# Patient Record
Sex: FEMALE | Race: WHITE | NOT HISPANIC OR LATINO | Employment: UNEMPLOYED | URBAN - NONMETROPOLITAN AREA
[De-identification: names, ages, dates, MRNs, and addresses within clinical notes are randomized per-mention and may not be internally consistent; named-entity substitution may affect disease eponyms.]

---

## 2017-09-07 ENCOUNTER — OFFICE VISIT (OUTPATIENT)
Dept: URGENT CARE | Facility: PHYSICIAN GROUP | Age: 67
End: 2017-09-07
Payer: MEDICARE

## 2017-09-07 VITALS
HEART RATE: 67 BPM | RESPIRATION RATE: 16 BRPM | TEMPERATURE: 97.9 F | BODY MASS INDEX: 41.19 KG/M2 | SYSTOLIC BLOOD PRESSURE: 120 MMHG | HEIGHT: 65 IN | OXYGEN SATURATION: 98 % | DIASTOLIC BLOOD PRESSURE: 82 MMHG | WEIGHT: 247.2 LBS

## 2017-09-07 DIAGNOSIS — G62.9 PERIPHERAL POLYNEUROPATHY: ICD-10-CM

## 2017-09-07 DIAGNOSIS — E66.01 MORBID OBESITY WITH BMI OF 40.0-44.9, ADULT (HCC): ICD-10-CM

## 2017-09-07 DIAGNOSIS — Z76.0 MEDICATION REFILL: Primary | ICD-10-CM

## 2017-09-07 PROCEDURE — 99214 OFFICE O/P EST MOD 30 MIN: CPT | Performed by: PHYSICIAN ASSISTANT

## 2017-09-07 RX ORDER — GABAPENTIN 400 MG/1
800 CAPSULE ORAL 4 TIMES DAILY
COMMUNITY
End: 2017-09-07 | Stop reason: SDUPTHER

## 2017-09-07 RX ORDER — GABAPENTIN 400 MG/1
800 CAPSULE ORAL 4 TIMES DAILY
Qty: 240 CAP | Refills: 0 | Status: SHIPPED | OUTPATIENT
Start: 2017-09-07 | End: 2017-10-07

## 2017-09-07 ASSESSMENT — PAIN SCALES - GENERAL: PAINLEVEL: 10=SEVERE PAIN

## 2017-09-07 NOTE — PATIENT INSTRUCTIONS
"Smoking Cessation, Tips for Success  If you are ready to quit smoking, congratulations! You have chosen to help yourself be healthier. Cigarettes bring nicotine, tar, carbon monoxide, and other irritants into your body. Your lungs, heart, and blood vessels will be able to work better without these poisons. There are many different ways to quit smoking. Nicotine gum, nicotine patches, a nicotine inhaler, or nicotine nasal spray can help with physical craving. Hypnosis, support groups, and medicines help break the habit of smoking.  WHAT THINGS CAN I DO TO MAKE QUITTING EASIER?   Here are some tips to help you quit for good:  · Pick a date when you will quit smoking completely. Tell all of your friends and family about your plan to quit on that date.  · Do not try to slowly cut down on the number of cigarettes you are smoking. Pick a quit date and quit smoking completely starting on that day.  · Throw away all cigarettes.    · Clean and remove all ashtrays from your home, work, and car.  · On a card, write down your reasons for quitting. Carry the card with you and read it when you get the urge to smoke.  · Cleanse your body of nicotine. Drink enough water and fluids to keep your urine clear or pale yellow. Do this after quitting to flush the nicotine from your body.  · Learn to predict your moods. Do not let a bad situation be your excuse to have a cigarette. Some situations in your life might tempt you into wanting a cigarette.  · Never have \"just one\" cigarette. It leads to wanting another and another. Remind yourself of your decision to quit.  · Change habits associated with smoking. If you smoked while driving or when feeling stressed, try other activities to replace smoking. Stand up when drinking your coffee. Brush your teeth after eating. Sit in a different chair when you read the paper. Avoid alcohol while trying to quit, and try to drink fewer caffeinated beverages. Alcohol and caffeine may urge you to " "smoke.  · Avoid foods and drinks that can trigger a desire to smoke, such as sugary or spicy foods and alcohol.  · Ask people who smoke not to smoke around you.  · Have something planned to do right after eating or having a cup of coffee. For example, plan to take a walk or exercise.  · Try a relaxation exercise to calm you down and decrease your stress. Remember, you may be tense and nervous for the first 2 weeks after you quit, but this will pass.  · Find new activities to keep your hands busy. Play with a pen, coin, or rubber band. Doodle or draw things on paper.  · Brush your teeth right after eating. This will help cut down on the craving for the taste of tobacco after meals. You can also try mouthwash.    · Use oral substitutes in place of cigarettes. Try using lemon drops, carrots, cinnamon sticks, or chewing gum. Keep them handy so they are available when you have the urge to smoke.  · When you have the urge to smoke, try deep breathing.  · Designate your home as a nonsmoking area.  · If you are a heavy smoker, ask your health care provider about a prescription for nicotine chewing gum. It can ease your withdrawal from nicotine.  · Reward yourself. Set aside the cigarette money you save and buy yourself something nice.  · Look for support from others. Join a support group or smoking cessation program. Ask someone at home or at work to help you with your plan to quit smoking.  · Always ask yourself, \"Do I need this cigarette or is this just a reflex?\" Tell yourself, \"Today, I choose not to smoke,\" or \"I do not want to smoke.\" You are reminding yourself of your decision to quit.  · Do not replace cigarette smoking with electronic cigarettes (commonly called e-cigarettes). The safety of e-cigarettes is unknown, and some may contain harmful chemicals.  · If you relapse, do not give up! Plan ahead and think about what you will do the next time you get the urge to smoke.  HOW WILL I FEEL WHEN I QUIT SMOKING?  You " may have symptoms of withdrawal because your body is used to nicotine (the addictive substance in cigarettes). You may crave cigarettes, be irritable, feel very hungry, cough often, get headaches, or have difficulty concentrating. The withdrawal symptoms are only temporary. They are strongest when you first quit but will go away within 10-14 days. When withdrawal symptoms occur, stay in control. Think about your reasons for quitting. Remind yourself that these are signs that your body is healing and getting used to being without cigarettes. Remember that withdrawal symptoms are easier to treat than the major diseases that smoking can cause.   Even after the withdrawal is over, expect periodic urges to smoke. However, these cravings are generally short lived and will go away whether you smoke or not. Do not smoke!  WHAT RESOURCES ARE AVAILABLE TO HELP ME QUIT SMOKING?  Your health care provider can direct you to community resources or hospitals for support, which may include:  · Group support.  · Education.  · Hypnosis.  · Therapy.     This information is not intended to replace advice given to you by your health care provider. Make sure you discuss any questions you have with your health care provider.     Document Released: 09/15/2005 Document Revised: 01/08/2016 Document Reviewed: 06/05/2014  [employer] Interactive Patient Education ©2016 [employer]

## 2017-09-07 NOTE — PROGRESS NOTES
Chief Complaint   Patient presents with   • Leg Pain     ran out of medication       HISTORY OF PRESENT ILLNESS: Patient is a 66 y.o. female who presents today becauseShe has bilateral leg pain. Has a history of peripheral neuropathy, ran out of her gabapentin a few weeks ago. She didn't think it was doing much, although she had only been taking 2 pills twice a day instead of 4 times a day as prescribed. However, when she came off of the medication, she noticed a significant difference in the pain in her legs, primarily at night.    Patient Active Problem List    Diagnosis Date Noted   • Morbid obesity with BMI of 40.0-44.9, adult ([facility]) 09/07/2017       Allergies:Review of patient's allergies indicates no known allergies.    Current Outpatient Prescriptions Ordered in [facility]   Medication Sig Dispense Refill   • GABAPENTIN PO Take  by mouth.     • gabapentin (NEURONTIN) 400 MG Cap Take 2 Caps by mouth 4 times a day for 30 days. 240 Cap 0     No current Epic-ordered facility-administered medications on file.        No past medical history on file.    Social History   Substance Use Topics   • Smoking status: Current Every Day Smoker     Packs/day: 1.00     Years: 50.00     Types: Cigarettes   • Smokeless tobacco: Never Used   • Alcohol use Yes      Comment: rare       No family status information on file.   No family history on file.    ROS:  Review of Systems   Constitutional: Negative for fever, chills, weight loss and malaise/fatigue.   HENT: Negative for ear pain, nosebleeds, congestion, sore throat and neck pain.    Eyes: Negative for blurred vision.   Respiratory: Negative for cough, sputum production, shortness of breath and wheezing.    Cardiovascular: Negative for chest pain, palpitations, orthopnea and leg swelling.   Gastrointestinal: Negative for heartburn, nausea, vomiting and abdominal pain.   Genitourinary: Negative for dysuria, urgency and frequency.     Exam:  Blood pressure 120/82, pulse 67, temperature  "36.6 °C (97.9 °F), resp. rate 16, height 1.651 m (5' 5\"), weight 112.1 kg (247 lb 3.2 oz), SpO2 98 %.  General:  Well nourished, well developed female in NAD  Head:Normocephalic, atraumatic  Eyes: PERRLA, EOM within normal limits, no conjunctival injection, no scleral icterus, visual fields and acuity grossly intact.  Pulmonary: chest is symmetrical with respiration, no wheezes, crackles, or rhonchi.  Cardiovascular: regular rate and rhythm without murmurs, rubs, or gallops.  Extremities: no clubbing, cyanosis, or edema.    Please note that this dictation was created using voice recognition software. I have made every reasonable attempt to correct obvious errors, but I expect that there are errors of grammar and possibly content that I did not discover before finalizing the note.    Assessment/Plan:  1. Medication refill     2. Morbid obesity with BMI of 40.0-44.9, adult (CMS-HCC)  Patient identified as having weight management issue.  Appropriate orders and counseling given.   3. Peripheral polyneuropathy (CMS-HCC)  gabapentin (NEURONTIN) 400 MG Cap       Followup with primary care in the next 7-10 days if not significantly improving, return to the urgent care or go to the emergency room sooner for any worsening of symptoms.       "

## 2018-04-18 ENCOUNTER — APPOINTMENT (OUTPATIENT)
Dept: RADIOLOGY | Facility: MEDICAL CENTER | Age: 68
DRG: 281 | End: 2018-04-18
Attending: STUDENT IN AN ORGANIZED HEALTH CARE EDUCATION/TRAINING PROGRAM
Payer: MEDICARE

## 2018-04-18 ENCOUNTER — HOSPITAL ENCOUNTER (INPATIENT)
Facility: MEDICAL CENTER | Age: 68
LOS: 2 days | DRG: 281 | End: 2018-04-20
Attending: EMERGENCY MEDICINE | Admitting: INTERNAL MEDICINE
Payer: MEDICARE

## 2018-04-18 ENCOUNTER — APPOINTMENT (OUTPATIENT)
Dept: RADIOLOGY | Facility: MEDICAL CENTER | Age: 68
DRG: 281 | End: 2018-04-18
Attending: EMERGENCY MEDICINE
Payer: MEDICARE

## 2018-04-18 ENCOUNTER — OFFICE VISIT (OUTPATIENT)
Dept: URGENT CARE | Facility: PHYSICIAN GROUP | Age: 68
End: 2018-04-18
Payer: MEDICARE

## 2018-04-18 VITALS
OXYGEN SATURATION: 96 % | HEIGHT: 65 IN | RESPIRATION RATE: 20 BRPM | SYSTOLIC BLOOD PRESSURE: 110 MMHG | HEART RATE: 84 BPM | DIASTOLIC BLOOD PRESSURE: 78 MMHG | TEMPERATURE: 98.8 F | WEIGHT: 256 LBS | BODY MASS INDEX: 42.65 KG/M2

## 2018-04-18 DIAGNOSIS — M79.2 NEUROPATHIC PAIN: ICD-10-CM

## 2018-04-18 DIAGNOSIS — R07.9 ACUTE CHEST PAIN: ICD-10-CM

## 2018-04-18 DIAGNOSIS — R07.2 PRECORDIAL PAIN: ICD-10-CM

## 2018-04-18 PROBLEM — G62.9 PERIPHERAL NEUROPATHY: Chronic | Status: ACTIVE | Noted: 2018-04-18

## 2018-04-18 PROBLEM — Z98.61 CAD S/P PERCUTANEOUS CORONARY ANGIOPLASTY: Chronic | Status: ACTIVE | Noted: 2018-04-18

## 2018-04-18 PROBLEM — I25.110 CORONARY ARTERY DISEASE WITH UNSTABLE ANGINA PECTORIS (HCC): Status: ACTIVE | Noted: 2018-04-18

## 2018-04-18 PROBLEM — I25.10 CAD S/P PERCUTANEOUS CORONARY ANGIOPLASTY: Chronic | Status: ACTIVE | Noted: 2018-04-18

## 2018-04-18 PROBLEM — I24.9 ACUTE CORONARY SYNDROME (HCC): Status: ACTIVE | Noted: 2018-04-18

## 2018-04-18 PROBLEM — F17.200 TOBACCO DEPENDENCY: Status: ACTIVE | Noted: 2018-04-18

## 2018-04-18 LAB
ALBUMIN SERPL BCP-MCNC: 3.8 G/DL (ref 3.2–4.9)
ALBUMIN/GLOB SERPL: 1.2 G/DL
ALP SERPL-CCNC: 67 U/L (ref 30–99)
ALT SERPL-CCNC: 14 U/L (ref 2–50)
ANION GAP SERPL CALC-SCNC: 9 MMOL/L (ref 0–11.9)
ANION GAP SERPL CALC-SCNC: 9 MMOL/L (ref 0–11.9)
APPEARANCE UR: CLEAR
AST SERPL-CCNC: 22 U/L (ref 12–45)
BASOPHILS # BLD AUTO: 0.7 % (ref 0–1.8)
BASOPHILS # BLD: 0.05 K/UL (ref 0–0.12)
BILIRUB SERPL-MCNC: 0.5 MG/DL (ref 0.1–1.5)
BILIRUB UR QL STRIP.AUTO: NEGATIVE
BNP SERPL-MCNC: 24 PG/ML (ref 0–100)
BUN SERPL-MCNC: 10 MG/DL (ref 8–22)
BUN SERPL-MCNC: 9 MG/DL (ref 8–22)
CALCIUM SERPL-MCNC: 9.5 MG/DL (ref 8.5–10.5)
CALCIUM SERPL-MCNC: 9.6 MG/DL (ref 8.5–10.5)
CHLORIDE SERPL-SCNC: 103 MMOL/L (ref 96–112)
CHLORIDE SERPL-SCNC: 107 MMOL/L (ref 96–112)
CO2 SERPL-SCNC: 23 MMOL/L (ref 20–33)
CO2 SERPL-SCNC: 26 MMOL/L (ref 20–33)
COLOR UR: YELLOW
CREAT SERPL-MCNC: 0.63 MG/DL (ref 0.5–1.4)
CREAT SERPL-MCNC: 0.66 MG/DL (ref 0.5–1.4)
EKG IMPRESSION: NORMAL
EOSINOPHIL # BLD AUTO: 0.13 K/UL (ref 0–0.51)
EOSINOPHIL NFR BLD: 1.7 % (ref 0–6.9)
ERYTHROCYTE [DISTWIDTH] IN BLOOD BY AUTOMATED COUNT: 43.8 FL (ref 35.9–50)
ERYTHROCYTE [DISTWIDTH] IN BLOOD BY AUTOMATED COUNT: 44.2 FL (ref 35.9–50)
GLOBULIN SER CALC-MCNC: 3.3 G/DL (ref 1.9–3.5)
GLUCOSE SERPL-MCNC: 117 MG/DL (ref 65–99)
GLUCOSE SERPL-MCNC: 118 MG/DL (ref 65–99)
GLUCOSE UR STRIP.AUTO-MCNC: NEGATIVE MG/DL
HCT VFR BLD AUTO: 50.9 % (ref 37–47)
HCT VFR BLD AUTO: 52 % (ref 37–47)
HGB BLD-MCNC: 17.1 G/DL (ref 12–16)
HGB BLD-MCNC: 17.4 G/DL (ref 12–16)
IMM GRANULOCYTES # BLD AUTO: 0.05 K/UL (ref 0–0.11)
IMM GRANULOCYTES NFR BLD AUTO: 0.7 % (ref 0–0.9)
INR PPP: 1.04 (ref 0.87–1.13)
KETONES UR STRIP.AUTO-MCNC: NEGATIVE MG/DL
LEUKOCYTE ESTERASE UR QL STRIP.AUTO: NEGATIVE
LYMPHOCYTES # BLD AUTO: 2.83 K/UL (ref 1–4.8)
LYMPHOCYTES NFR BLD: 36.8 % (ref 22–41)
MAGNESIUM SERPL-MCNC: 2.1 MG/DL (ref 1.5–2.5)
MCH RBC QN AUTO: 32.4 PG (ref 27–33)
MCH RBC QN AUTO: 32.5 PG (ref 27–33)
MCHC RBC AUTO-ENTMCNC: 33.5 G/DL (ref 33.6–35)
MCHC RBC AUTO-ENTMCNC: 33.6 G/DL (ref 33.6–35)
MCV RBC AUTO: 96.6 FL (ref 81.4–97.8)
MCV RBC AUTO: 97.2 FL (ref 81.4–97.8)
MICRO URNS: NORMAL
MONOCYTES # BLD AUTO: 0.35 K/UL (ref 0–0.85)
MONOCYTES NFR BLD AUTO: 4.6 % (ref 0–13.4)
NEUTROPHILS # BLD AUTO: 4.27 K/UL (ref 2–7.15)
NEUTROPHILS NFR BLD: 55.5 % (ref 44–72)
NITRITE UR QL STRIP.AUTO: NEGATIVE
NRBC # BLD AUTO: 0 K/UL
NRBC BLD-RTO: 0 /100 WBC
PH UR STRIP.AUTO: 8 [PH]
PLATELET # BLD AUTO: 200 K/UL (ref 164–446)
PLATELET # BLD AUTO: 216 K/UL (ref 164–446)
PMV BLD AUTO: 10 FL (ref 9–12.9)
PMV BLD AUTO: 10.5 FL (ref 9–12.9)
POTASSIUM SERPL-SCNC: 3.8 MMOL/L (ref 3.6–5.5)
POTASSIUM SERPL-SCNC: 3.9 MMOL/L (ref 3.6–5.5)
PROT SERPL-MCNC: 7.1 G/DL (ref 6–8.2)
PROT UR QL STRIP: NEGATIVE MG/DL
PROTHROMBIN TIME: 13.3 SEC (ref 12–14.6)
RBC # BLD AUTO: 5.27 M/UL (ref 4.2–5.4)
RBC # BLD AUTO: 5.35 M/UL (ref 4.2–5.4)
RBC UR QL AUTO: NEGATIVE
SODIUM SERPL-SCNC: 138 MMOL/L (ref 135–145)
SODIUM SERPL-SCNC: 139 MMOL/L (ref 135–145)
SP GR UR STRIP.AUTO: 1.01
TROPONIN I SERPL-MCNC: 0.2 NG/ML (ref 0–0.04)
TROPONIN I SERPL-MCNC: 0.34 NG/ML (ref 0–0.04)
UROBILINOGEN UR STRIP.AUTO-MCNC: 0.2 MG/DL
WBC # BLD AUTO: 6.9 K/UL (ref 4.8–10.8)
WBC # BLD AUTO: 7.7 K/UL (ref 4.8–10.8)

## 2018-04-18 PROCEDURE — 4A023N7 MEASUREMENT OF CARDIAC SAMPLING AND PRESSURE, LEFT HEART, PERCUTANEOUS APPROACH: ICD-10-PCS | Performed by: INTERNAL MEDICINE

## 2018-04-18 PROCEDURE — 700101 HCHG RX REV CODE 250

## 2018-04-18 PROCEDURE — 700111 HCHG RX REV CODE 636 W/ 250 OVERRIDE (IP): Performed by: INTERNAL MEDICINE

## 2018-04-18 PROCEDURE — 85025 COMPLETE CBC W/AUTO DIFF WBC: CPT

## 2018-04-18 PROCEDURE — 70450 CT HEAD/BRAIN W/O DYE: CPT

## 2018-04-18 PROCEDURE — C1894 INTRO/SHEATH, NON-LASER: HCPCS

## 2018-04-18 PROCEDURE — 700105 HCHG RX REV CODE 258: Performed by: INTERNAL MEDICINE

## 2018-04-18 PROCEDURE — 84484 ASSAY OF TROPONIN QUANT: CPT

## 2018-04-18 PROCEDURE — 307093 HCHG TR BAND RADIAL

## 2018-04-18 PROCEDURE — A9270 NON-COVERED ITEM OR SERVICE: HCPCS | Performed by: INTERNAL MEDICINE

## 2018-04-18 PROCEDURE — 770022 HCHG ROOM/CARE - ICU (200)

## 2018-04-18 PROCEDURE — 93005 ELECTROCARDIOGRAM TRACING: CPT | Performed by: STUDENT IN AN ORGANIZED HEALTH CARE EDUCATION/TRAINING PROGRAM

## 2018-04-18 PROCEDURE — 360979 HCHG DIAGNOSTIC CATH

## 2018-04-18 PROCEDURE — 99291 CRITICAL CARE FIRST HOUR: CPT

## 2018-04-18 PROCEDURE — 96365 THER/PROPH/DIAG IV INF INIT: CPT

## 2018-04-18 PROCEDURE — 83735 ASSAY OF MAGNESIUM: CPT

## 2018-04-18 PROCEDURE — 304952 HCHG R 2 PADS

## 2018-04-18 PROCEDURE — 700102 HCHG RX REV CODE 250 W/ 637 OVERRIDE(OP): Performed by: HOSPITALIST

## 2018-04-18 PROCEDURE — 700111 HCHG RX REV CODE 636 W/ 250 OVERRIDE (IP)

## 2018-04-18 PROCEDURE — 700102 HCHG RX REV CODE 250 W/ 637 OVERRIDE(OP): Performed by: INTERNAL MEDICINE

## 2018-04-18 PROCEDURE — B2151ZZ FLUOROSCOPY OF LEFT HEART USING LOW OSMOLAR CONTRAST: ICD-10-PCS | Performed by: INTERNAL MEDICINE

## 2018-04-18 PROCEDURE — 71045 X-RAY EXAM CHEST 1 VIEW: CPT

## 2018-04-18 PROCEDURE — 93970 EXTREMITY STUDY: CPT

## 2018-04-18 PROCEDURE — 83880 ASSAY OF NATRIURETIC PEPTIDE: CPT

## 2018-04-18 PROCEDURE — B2111ZZ FLUOROSCOPY OF MULTIPLE CORONARY ARTERIES USING LOW OSMOLAR CONTRAST: ICD-10-PCS | Performed by: INTERNAL MEDICINE

## 2018-04-18 PROCEDURE — 71275 CT ANGIOGRAPHY CHEST: CPT

## 2018-04-18 PROCEDURE — 80053 COMPREHEN METABOLIC PANEL: CPT

## 2018-04-18 PROCEDURE — A9270 NON-COVERED ITEM OR SERVICE: HCPCS | Performed by: HOSPITALIST

## 2018-04-18 PROCEDURE — 99152 MOD SED SAME PHYS/QHP 5/>YRS: CPT

## 2018-04-18 PROCEDURE — A9270 NON-COVERED ITEM OR SERVICE: HCPCS | Performed by: STUDENT IN AN ORGANIZED HEALTH CARE EDUCATION/TRAINING PROGRAM

## 2018-04-18 PROCEDURE — 700111 HCHG RX REV CODE 636 W/ 250 OVERRIDE (IP): Performed by: HOSPITALIST

## 2018-04-18 PROCEDURE — 85610 PROTHROMBIN TIME: CPT

## 2018-04-18 PROCEDURE — 81003 URINALYSIS AUTO W/O SCOPE: CPT

## 2018-04-18 PROCEDURE — 700102 HCHG RX REV CODE 250 W/ 637 OVERRIDE(OP): Performed by: EMERGENCY MEDICINE

## 2018-04-18 PROCEDURE — 700117 HCHG RX CONTRAST REV CODE 255: Performed by: INTERNAL MEDICINE

## 2018-04-18 PROCEDURE — 85730 THROMBOPLASTIN TIME PARTIAL: CPT

## 2018-04-18 PROCEDURE — 80048 BASIC METABOLIC PNL TOTAL CA: CPT

## 2018-04-18 PROCEDURE — 96368 THER/DIAG CONCURRENT INF: CPT

## 2018-04-18 PROCEDURE — C1769 GUIDE WIRE: HCPCS

## 2018-04-18 PROCEDURE — 36415 COLL VENOUS BLD VENIPUNCTURE: CPT

## 2018-04-18 PROCEDURE — 700102 HCHG RX REV CODE 250 W/ 637 OVERRIDE(OP): Performed by: STUDENT IN AN ORGANIZED HEALTH CARE EDUCATION/TRAINING PROGRAM

## 2018-04-18 PROCEDURE — 85027 COMPLETE CBC AUTOMATED: CPT

## 2018-04-18 PROCEDURE — 96366 THER/PROPH/DIAG IV INF ADDON: CPT

## 2018-04-18 PROCEDURE — 700117 HCHG RX CONTRAST REV CODE 255: Performed by: EMERGENCY MEDICINE

## 2018-04-18 PROCEDURE — 96375 TX/PRO/DX INJ NEW DRUG ADDON: CPT

## 2018-04-18 PROCEDURE — A9270 NON-COVERED ITEM OR SERVICE: HCPCS | Performed by: EMERGENCY MEDICINE

## 2018-04-18 PROCEDURE — 99204 OFFICE O/P NEW MOD 45 MIN: CPT | Performed by: INTERNAL MEDICINE

## 2018-04-18 PROCEDURE — 93458 L HRT ARTERY/VENTRICLE ANGIO: CPT

## 2018-04-18 RX ORDER — OMEPRAZOLE 40 MG/1
40 CAPSULE, DELAYED RELEASE ORAL DAILY
COMMUNITY

## 2018-04-18 RX ORDER — SODIUM CHLORIDE 9 MG/ML
INJECTION, SOLUTION INTRAVENOUS CONTINUOUS
Status: DISCONTINUED | OUTPATIENT
Start: 2018-04-18 | End: 2018-04-19

## 2018-04-18 RX ORDER — ACETAMINOPHEN 325 MG/1
650 TABLET ORAL EVERY 6 HOURS PRN
Status: DISCONTINUED | OUTPATIENT
Start: 2018-04-18 | End: 2018-04-20 | Stop reason: HOSPADM

## 2018-04-18 RX ORDER — NITROGLYCERIN 0.4 MG/1
0.4 TABLET SUBLINGUAL PRN
Status: DISCONTINUED | OUTPATIENT
Start: 2018-04-18 | End: 2018-04-20 | Stop reason: HOSPADM

## 2018-04-18 RX ORDER — NICOTINE 21 MG/24HR
21 PATCH, TRANSDERMAL 24 HOURS TRANSDERMAL
Status: DISCONTINUED | OUTPATIENT
Start: 2018-04-18 | End: 2018-04-20 | Stop reason: HOSPADM

## 2018-04-18 RX ORDER — IBUPROFEN 200 MG
800 TABLET ORAL 2 TIMES DAILY PRN
Status: ON HOLD | COMMUNITY
End: 2018-04-20

## 2018-04-18 RX ORDER — ONDANSETRON 4 MG/1
4 TABLET, ORALLY DISINTEGRATING ORAL EVERY 4 HOURS PRN
Status: DISCONTINUED | OUTPATIENT
Start: 2018-04-18 | End: 2018-04-20 | Stop reason: HOSPADM

## 2018-04-18 RX ORDER — SODIUM CHLORIDE 9 MG/ML
INJECTION, SOLUTION INTRAVENOUS CONTINUOUS
Status: DISCONTINUED | OUTPATIENT
Start: 2018-04-18 | End: 2018-04-18

## 2018-04-18 RX ORDER — MORPHINE SULFATE 4 MG/ML
4 INJECTION, SOLUTION INTRAMUSCULAR; INTRAVENOUS
Status: DISCONTINUED | OUTPATIENT
Start: 2018-04-18 | End: 2018-04-20 | Stop reason: HOSPADM

## 2018-04-18 RX ORDER — NITROGLYCERIN 20 MG/100ML
0-200 INJECTION INTRAVENOUS CONTINUOUS
Status: DISCONTINUED | OUTPATIENT
Start: 2018-04-18 | End: 2018-04-19

## 2018-04-18 RX ORDER — METOPROLOL SUCCINATE 25 MG/1
25 TABLET, EXTENDED RELEASE ORAL
Status: DISCONTINUED | OUTPATIENT
Start: 2018-04-18 | End: 2018-04-19

## 2018-04-18 RX ORDER — HEPARIN SODIUM 1000 [USP'U]/ML
3200 INJECTION, SOLUTION INTRAVENOUS; SUBCUTANEOUS PRN
Status: DISCONTINUED | OUTPATIENT
Start: 2018-04-18 | End: 2018-04-20

## 2018-04-18 RX ORDER — HEPARIN SODIUM,PORCINE 1000/ML
VIAL (ML) INJECTION
Status: COMPLETED
Start: 2018-04-18 | End: 2018-04-18

## 2018-04-18 RX ORDER — ATORVASTATIN CALCIUM 80 MG/1
80 TABLET, FILM COATED ORAL
Status: DISCONTINUED | OUTPATIENT
Start: 2018-04-18 | End: 2018-04-20 | Stop reason: HOSPADM

## 2018-04-18 RX ORDER — LABETALOL HYDROCHLORIDE 5 MG/ML
10 INJECTION, SOLUTION INTRAVENOUS EVERY 4 HOURS PRN
Status: DISCONTINUED | OUTPATIENT
Start: 2018-04-18 | End: 2018-04-20 | Stop reason: HOSPADM

## 2018-04-18 RX ORDER — HEPARIN SODIUM 1000 [USP'U]/ML
6000 INJECTION, SOLUTION INTRAVENOUS; SUBCUTANEOUS ONCE
Status: COMPLETED | OUTPATIENT
Start: 2018-04-18 | End: 2018-04-18

## 2018-04-18 RX ORDER — VERAPAMIL HYDROCHLORIDE 2.5 MG/ML
INJECTION, SOLUTION INTRAVENOUS
Status: COMPLETED
Start: 2018-04-18 | End: 2018-04-18

## 2018-04-18 RX ORDER — AMOXICILLIN 250 MG
2 CAPSULE ORAL 2 TIMES DAILY
Status: DISCONTINUED | OUTPATIENT
Start: 2018-04-18 | End: 2018-04-20 | Stop reason: HOSPADM

## 2018-04-18 RX ORDER — GABAPENTIN 400 MG/1
800 CAPSULE ORAL 4 TIMES DAILY
COMMUNITY

## 2018-04-18 RX ORDER — NABUMETONE 500 MG/1
500 TABLET, FILM COATED ORAL DAILY
COMMUNITY
End: 2018-04-18

## 2018-04-18 RX ORDER — LIDOCAINE HYDROCHLORIDE 20 MG/ML
INJECTION, SOLUTION INFILTRATION; PERINEURAL
Status: COMPLETED
Start: 2018-04-18 | End: 2018-04-18

## 2018-04-18 RX ORDER — MIDAZOLAM HYDROCHLORIDE 1 MG/ML
INJECTION INTRAMUSCULAR; INTRAVENOUS
Status: COMPLETED
Start: 2018-04-18 | End: 2018-04-18

## 2018-04-18 RX ORDER — ONDANSETRON 2 MG/ML
4 INJECTION INTRAMUSCULAR; INTRAVENOUS EVERY 4 HOURS PRN
Status: DISCONTINUED | OUTPATIENT
Start: 2018-04-18 | End: 2018-04-18

## 2018-04-18 RX ORDER — GABAPENTIN 400 MG/1
400 CAPSULE ORAL ONCE
Status: COMPLETED | OUTPATIENT
Start: 2018-04-18 | End: 2018-04-18

## 2018-04-18 RX ORDER — POLYETHYLENE GLYCOL 3350 17 G/17G
1 POWDER, FOR SOLUTION ORAL
Status: DISCONTINUED | OUTPATIENT
Start: 2018-04-18 | End: 2018-04-20 | Stop reason: HOSPADM

## 2018-04-18 RX ORDER — BISACODYL 10 MG
10 SUPPOSITORY, RECTAL RECTAL
Status: DISCONTINUED | OUTPATIENT
Start: 2018-04-18 | End: 2018-04-20 | Stop reason: HOSPADM

## 2018-04-18 RX ORDER — ACETAMINOPHEN 325 MG/1
650 TABLET ORAL ONCE
Status: COMPLETED | OUTPATIENT
Start: 2018-04-18 | End: 2018-04-18

## 2018-04-18 RX ORDER — ATORVASTATIN CALCIUM 20 MG/1
40 TABLET, FILM COATED ORAL
Status: DISCONTINUED | OUTPATIENT
Start: 2018-04-18 | End: 2018-04-18

## 2018-04-18 RX ADMIN — SODIUM CHLORIDE: 9 INJECTION, SOLUTION INTRAVENOUS at 20:48

## 2018-04-18 RX ADMIN — NITROGLYCERIN 20 MCG/MIN: 20 INJECTION INTRAVENOUS at 16:18

## 2018-04-18 RX ADMIN — NITROGLYCERIN 10 ML: 20 INJECTION INTRAVENOUS at 18:35

## 2018-04-18 RX ADMIN — IOHEXOL 50 ML: 350 INJECTION, SOLUTION INTRAVENOUS at 15:20

## 2018-04-18 RX ADMIN — NITROGLYCERIN 0.4 MG: 0.4 TABLET SUBLINGUAL at 16:16

## 2018-04-18 RX ADMIN — NITROGLYCERIN 0.4 MG: 0.4 TABLET SUBLINGUAL at 16:23

## 2018-04-18 RX ADMIN — ATORVASTATIN CALCIUM 80 MG: 80 TABLET, FILM COATED ORAL at 20:48

## 2018-04-18 RX ADMIN — LIDOCAINE HYDROCHLORIDE: 20 INJECTION, SOLUTION INFILTRATION; PERINEURAL at 18:33

## 2018-04-18 RX ADMIN — FENTANYL CITRATE 50 MCG: 50 INJECTION, SOLUTION INTRAMUSCULAR; INTRAVENOUS at 18:46

## 2018-04-18 RX ADMIN — NITROGLYCERIN 1 INCH: 20 OINTMENT TOPICAL at 15:35

## 2018-04-18 RX ADMIN — HEPARIN SODIUM 2000 UNITS: 200 INJECTION, SOLUTION INTRAVENOUS at 18:35

## 2018-04-18 RX ADMIN — GABAPENTIN 400 MG: 400 CAPSULE ORAL at 20:49

## 2018-04-18 RX ADMIN — HEPARIN SODIUM 25000 UNITS: 5000 INJECTION, SOLUTION INTRAVENOUS; SUBCUTANEOUS at 17:24

## 2018-04-18 RX ADMIN — HEPARIN SODIUM: 1000 INJECTION, SOLUTION INTRAVENOUS; SUBCUTANEOUS at 18:34

## 2018-04-18 RX ADMIN — MORPHINE SULFATE 2 MG: 4 INJECTION INTRAVENOUS at 17:38

## 2018-04-18 RX ADMIN — VERAPAMIL HYDROCHLORIDE 2.5 MG: 2.5 INJECTION, SOLUTION INTRAVENOUS at 18:34

## 2018-04-18 RX ADMIN — MIDAZOLAM 1.5 MG: 1 INJECTION INTRAMUSCULAR; INTRAVENOUS at 18:46

## 2018-04-18 RX ADMIN — IOHEXOL 109 ML: 350 INJECTION, SOLUTION INTRAVENOUS at 18:46

## 2018-04-18 RX ADMIN — ACETAMINOPHEN 650 MG: 325 TABLET, FILM COATED ORAL at 17:15

## 2018-04-18 RX ADMIN — HEPARIN SODIUM 6000 UNITS: 1000 INJECTION, SOLUTION INTRAVENOUS; SUBCUTANEOUS at 17:24

## 2018-04-18 RX ADMIN — NITROGLYCERIN 0.4 MG: 0.4 TABLET SUBLINGUAL at 16:30

## 2018-04-18 ASSESSMENT — ENCOUNTER SYMPTOMS
EYE REDNESS: 0
HEADACHES: 0
NAUSEA: 0
INSOMNIA: 0
ABDOMINAL PAIN: 0
PHOTOPHOBIA: 0
SHORTNESS OF BREATH: 0
BACK PAIN: 1
VOMITING: 0
MYALGIAS: 0
FEVER: 0
PALPITATIONS: 0
FOCAL WEAKNESS: 0
SINUS PAIN: 0
WEIGHT LOSS: 0
SEIZURES: 0
HEMOPTYSIS: 0
PALPITATIONS: 0
EYES NEGATIVE: 1
ORTHOPNEA: 0
VOMITING: 0
WHEEZING: 0
SHORTNESS OF BREATH: 1
DIAPHORESIS: 1
NAUSEA: 0
SORE THROAT: 0
SINUS PAIN: 0
COUGH: 1
PND: 1
WEAKNESS: 1
SPUTUM PRODUCTION: 0
CHILLS: 0
COUGH: 0
WHEEZING: 0
FEVER: 0
BLURRED VISION: 0
PSYCHIATRIC NEGATIVE: 1
DIZZINESS: 0
SORE THROAT: 0
NECK PAIN: 1
FALLS: 0
BLOOD IN STOOL: 0
CHILLS: 0
DIARRHEA: 0
NECK PAIN: 0
MEMORY LOSS: 0
CLAUDICATION: 1
HEADACHES: 1
ORTHOPNEA: 1

## 2018-04-18 ASSESSMENT — PAIN SCALES - GENERAL
PAINLEVEL_OUTOF10: 0
PAINLEVEL_OUTOF10: 8
PAINLEVEL_OUTOF10: 0

## 2018-04-18 ASSESSMENT — LIFESTYLE VARIABLES
EVER_SMOKED: YES
ALCOHOL_USE: NO
EVER_SMOKED: YES

## 2018-04-18 ASSESSMENT — COPD QUESTIONNAIRES
DO YOU EVER COUGH UP ANY MUCUS OR PHLEGM?: YES, A FEW DAYS A WEEK OR MONTH
COPD SCREENING SCORE: 6
HAVE YOU SMOKED AT LEAST 100 CIGARETTES IN YOUR ENTIRE LIFE: YES
DURING THE PAST 4 WEEKS HOW MUCH DID YOU FEEL SHORT OF BREATH: SOME OF THE TIME

## 2018-04-18 NOTE — PROGRESS NOTES
Raine Harrison is a 67 y.o. female who presents for Chest Pain (Headache; Lightheadedness)       Patient is a 67-year-old female who presents today with chest pain and pressure. Patient states that she's been having intermittent episodes for the last several weeks. He states today was the worst at the time presentation she stated that her pain was 6 out of 10. She states that it radiates to her neck and left shoulder. Patient states is similar to her prior chest pain. Patient does have a history of multiple stents and stent revision. She had the procedures done in [place]. No records here no EKG for comparison EKG here shows old right-sided changes. Patient states that she's had dyspnea on exertion, orthopnea as well as dyspnea on exertion. He is a long-standing history of smoking with 50-pack-year's. Patient states that she was diaphoretic. And felt mildly nauseous. Patient also been complaining of cramping and claudication of the lower extremities. Patient denies any calf pain or calf tenderness. Patient states she has a chronic cough. No fevers shakes or chills. No nausea vomiting or diarrhea. No rash.        PMH:  has a past medical history of Atherosclerotic heart disease of native coronary artery without angina pectoris.  MEDS:   Current Outpatient Prescriptions:   •  Gabapentin (NEURONTIN PO), Take  by mouth., Disp: , Rfl:   •  GABAPENTIN PO, Take  by mouth., Disp: , Rfl:   ALLERGIES: No Known Allergies  SURGHX:   Past Surgical History:   Procedure Laterality Date   • ANGIOPLASTY       SOCHX:  reports that she has been smoking Cigarettes.  She has a 50.00 pack-year smoking history. She has never used smokeless tobacco. She reports that she drinks alcohol. She reports that she does not use drugs.  FH: family history is not on file.    Review of Systems   Constitutional: Positive for diaphoresis. Negative for chills, fever and weight loss.   HENT: Negative for congestion, sinus pain, sore throat and  "tinnitus.    Eyes: Negative.    Respiratory: Positive for cough. Negative for hemoptysis, sputum production, shortness of breath and wheezing.    Cardiovascular: Positive for chest pain, orthopnea, claudication and PND. Negative for palpitations and leg swelling.   Gastrointestinal: Negative for blood in stool, diarrhea, nausea and vomiting.   Genitourinary: Negative for dysuria and urgency.   Musculoskeletal: Negative for myalgias and neck pain.   Skin: Negative for itching and rash.   Neurological: Positive for weakness. Negative for dizziness (negative headache) and headaches.   Psychiatric/Behavioral: Negative.      No Known Allergies   Objective:   /78   Pulse 84   Temp 37.1 °C (98.8 °F)   Resp 20   Ht 1.651 m (5' 5\")   Wt 116.1 kg (256 lb)   SpO2 96%   BMI 42.60 kg/m²   Physical Exam   Constitutional: She is oriented to person, place, and time. She appears well-developed and well-nourished. She is active. She appears distressed.   HENT:   Head: Normocephalic and atraumatic.   Right Ear: External ear normal.   Left Ear: External ear normal.   Mouth/Throat: Oropharynx is clear and moist. No oropharyngeal exudate.   Eyes: Conjunctivae and EOM are normal. Pupils are equal, round, and reactive to light. Right eye exhibits no discharge. Left eye exhibits no discharge. No scleral icterus.   Neck: Normal range of motion. Neck supple. No JVD present. Carotid bruit is not present. No thyroid mass and no thyromegaly present.   Cardiovascular: Normal rate, regular rhythm, S1 normal, S2 normal and normal heart sounds.  Exam reveals no friction rub.    No murmur heard.  Pulmonary/Chest: Effort normal. No respiratory distress. She has decreased breath sounds. She has no wheezes. She has no rales.   Abdominal: Soft. Bowel sounds are normal. She exhibits no distension and no mass. There is no hepatosplenomegaly. There is no tenderness. There is no rebound and no guarding.   Musculoskeletal: Normal range of motion. " She exhibits no edema.        Cervical back: Normal.   Lymphadenopathy:        Head (right side): No submental, no submandibular and no occipital adenopathy present.        Head (left side): No submental, no submandibular and no occipital adenopathy present.     She has no cervical adenopathy.   Neurological: She is alert and oriented to person, place, and time. She has normal strength. No cranial nerve deficit.   Skin: Skin is warm and dry. No rash noted. She is not diaphoretic. No erythema.   Psychiatric: She has a normal mood and affect. Her behavior is normal. Thought content normal.         Assessment/Plan:   Assessment    Differential diagnosis, natural history, supportive care, and indications for immediate follow-up discussed.  Chest pain    Patient with a long-standing history of smoking, multiple cardiac stents who presents today with 6 out of 10 chest pain/pressure. Discussed with patient patient needs further evaluation at the emergency department. We'll contact ambulance services and had the patient transported for further evaluation. We'll place the patient on 2 L nasal O2 at this time

## 2018-04-18 NOTE — ED TRIAGE NOTES
66 y/o female bib ambulance from Urgent Care in [place] for evaluation of pain in her chest radiating to her neck and head, pt has had symptoms x 3 days. She also describes bilateral leg pain and weakness. Pt denies exacerbating or relieving factors to her pain and states the pain is both dull and sharp. Pt was given 324 mg aspirin by paramedics in route to ED.

## 2018-04-18 NOTE — ED NOTES
Med rec complete per pt and [facility] [place].   Per pharmacy Pt has a Nabumetone 500 mg QD waiting to be picked up from April 9th.   Allergies reviewed

## 2018-04-18 NOTE — H&P
Internal Medicine Admitting History and Physical    Note Author: Tierra Barahona M.D.       Name Raine Harrison     1950   Age/Sex 67 y.o. female   MRN [id number]   Code Status Full Code     After 5PM or if no immediate response to page, please call for cross-coverage  Attending/Team: Dr. Mclain/Christie See Patient List for primary contact information  Call [phone] to page    1st Call - Day Intern (R1):   Dr. Barahona 2nd Call - Day Sr. Resident (R2/R3):   Dr. Richards       Chief Complaint:  Chest pain for 2 weeks    HPI:  67 year old female with PMHx of CAD (s/p 4 stents), DVT (not on anticoagulation), peripheral neuropathy, impaired glucose tolerance presented to ED after transfer from outside hospital for persistent chest pain. Patient states for about the past 2 weeks, she has been having intermittent substernal chest pain/pressure 8/10, radiating to her left shoulder and neck, lasting about 1-6 hrs, not associated with any precipitating or relieving factors. This morning, she developed similar chest pain however it had lasted longer and was more severe compared to previous episodes so she went to the hospital for further management.     In the outside hospital, patient notes the physician was concerned and recommended transfer to [facility].  Patient states she did not receive any nitroglycerin during this time. On admission, patient had received  mg. EKG done in the ED was significant for old Q-wave, regular rate and rhythm, no ST segment depressions or elevations. Troponin was found to be elevated at 0.20. Cardiology was consulted. Dr. Moreland saw patient in the ED. Patient was started on sublingual nitroglycerin along with IV nitroglycerin titrated till patient was chest pain free. Patient become chest pain free with 3 doses of sublingual nitroglycerin along with IV nitroglycerin titrated to 65mcg/min. Dr. Moreland discussed with cath lab, patient is to go for cardiac cath. IV heparin was  "started as well. Case was also discussed with ICU resident who spoke with ICU attending (Dr. Haynes); depending on results of cardiac cath, patient will either be admitted to ICU vs telemetry floor. Medicine team to notify night ICU attending if patient is being transferred to ICU    In addition, patient noted to have more lower extremity weakness in bilateral lower extremity weakness, which has been progressive over the past 3 weeks. Family noted that she has been more deconditioned over the past year or so however requiring a cane over the past 3 weeks. Patient has h/o peripheral vascular disease, treating with gabapentin      Review of Systems   Constitutional: Negative for chills, fever and malaise/fatigue.   HENT: Negative for nosebleeds, sinus pain and sore throat.    Eyes: Negative for blurred vision, photophobia and redness.   Respiratory: Positive for shortness of breath. Negative for cough and wheezing.         H/o smoking for almost 50 years up to a 1 ppd. Increased SOB with exertion over the past 2 weeks.    Cardiovascular: Positive for chest pain. Negative for palpitations, orthopnea and leg swelling.   Gastrointestinal: Negative for abdominal pain, nausea and vomiting.   Genitourinary: Negative for dysuria, frequency and hematuria.        States she is \"almost\" urinary incontinent    Musculoskeletal: Positive for back pain and neck pain. Negative for falls.   Skin: Negative for itching and rash.   Neurological: Positive for headaches. Negative for focal weakness and seizures.        C/o left sided headache, non-radiating. Not associated with neck stiffness, visual changes or photophobia. Headache worsened with administration of nitroglycerin   Psychiatric/Behavioral: Negative for memory loss. The patient does not have insomnia.              Past Medical History:   Past Medical History:   Diagnosis Date   • Atherosclerotic heart disease of native coronary artery without angina pectoris     Coronary " "artery disease   • DVT (deep venous thrombosis) (CMS-HCC)    • Neuropathy (CMS-[facility])        Past Surgical History:  Past Surgical History:   Procedure Laterality Date   • ANGIOPLASTY         Current Outpatient Medications:  1) Gabapentin  2) Ibprofen 800 mg BID  She is NOT on any cardiac or htn medications  Home Medications     Reviewed by Deya Muro (Pharmacy Tech) on 04/18/18 at 1538  Med List Status: Complete   Medication Last Dose Status   gabapentin (NEURONTIN) 400 MG Cap 4/17/2018 Active   ibuprofen (MOTRIN) 200 MG Tab 4/18/2018 Active   omeprazole (PRILOSEC) 40 MG delayed-release capsule 4/18/2018 Active                Medication Allergy/Sensitivities:  No Known Allergies      Family History:  Mother had aplastic anemia    Social History:  Social History     Social History   • Marital status: Single     Spouse name: N/A   • Number of children: N/A   • Years of education: N/A     Occupational History   • Not on file.     Social History Main Topics   • Smoking status: Current Every Day Smoker     Packs/day: 1.00     Years: 50.00     Types: Cigarettes   • Smokeless tobacco: Never Used   • Alcohol use Yes      Comment: rare   • Drug use: No   • Sexual activity: Not on file     Other Topics Concern   • Not on file     Social History Narrative   • No narrative on file     Living situation: Lives alone, brother and sister-in-law help her out  PCP: Hamzah Knott      Physical Exam     Vitals:    04/18/18 1714 04/18/18 1716 04/18/18 1720 04/18/18 1725   BP:       Pulse: 86 83 80 90   Resp:       Temp:       SpO2: 94% 93% 92% 93%   Weight:       Height:         Body mass index is 38.95 kg/m².  /68   Pulse 90   Temp 37.1 °C (98.8 °F)   Resp 20   Ht 1.689 m (5' 6.5\")   Wt 111.1 kg (245 lb)   SpO2 93%   BMI 38.95 kg/m²   O2 therapy: Pulse Oximetry: 93 %, O2 (LPM): 2, O2 Delivery: Nasal Cannula    Physical Exam   Constitutional: She is oriented to person, place, and time and well-developed, " well-nourished, and in no distress.   HENT:   Head: Normocephalic and atraumatic.   Mouth/Throat: No oropharyngeal exudate.   Eyes: Conjunctivae and EOM are normal. Pupils are equal, round, and reactive to light. No scleral icterus.   Neck: Normal range of motion. Neck supple.   Cardiovascular: Normal rate, regular rhythm and normal heart sounds.    No murmur heard.  Pulmonary/Chest: Effort normal and breath sounds normal. No stridor. No respiratory distress. She has no wheezes. She has no rales.   Abdominal: Soft. There is no tenderness. There is no rebound and no guarding.   Musculoskeletal: Normal range of motion. She exhibits no edema.   Lymphadenopathy:     She has no cervical adenopathy.   Neurological: She is alert and oriented to person, place, and time. No cranial nerve deficit. She exhibits normal muscle tone.   Skin: Skin is warm and dry. No erythema.   Psychiatric: Mood, memory, affect and judgment normal.   Nursing note and vitals reviewed.            Data Review       Old Records Request:   Completed  Current Records review and summary: Completed    Lab Data Review:  Recent Results (from the past 24 hour(s))   EKG (IP)    Collection Time: 18 12:19 PM   Result Value Ref Range    Report       [facility] Emergency Dept.    Test Date:  2018  Pt Name:    NAZANIN FALCON             Department: ER  MRN:        [id number]                      Room:       [facility]  Gender:     Female                       Technician: [id number]  :        1950                   Requested By:SANTO SALDAÑA  Order #:    [id number]                    Reading MD:    Measurements  Intervals                                Axis  Rate:       76                           P:          42  AL:         192                          QRS:        -41  QRSD:       102                          T:          95  QT:         388  QTc:        437    Interpretive Statements  SINUS RHYTHM  LEFT AXIS DEVIATION  BORDERLINE T  WAVE ABNORMALITIES  No previous ECG available for comparison     CBC WITH DIFFERENTIAL    Collection Time: 04/18/18 12:30 PM   Result Value Ref Range    WBC 7.7 4.8 - 10.8 K/uL    RBC 5.35 4.20 - 5.40 M/uL    Hemoglobin 17.4 (H) 12.0 - 16.0 g/dL    Hematocrit 52.0 (H) 37.0 - 47.0 %    MCV 97.2 81.4 - 97.8 fL    MCH 32.5 27.0 - 33.0 pg    MCHC 33.5 (L) 33.6 - 35.0 g/dL    RDW 44.2 35.9 - 50.0 fL    Platelet Count 216 164 - 446 K/uL    MPV 10.5 9.0 - 12.9 fL    Neutrophils-Polys 55.50 44.00 - 72.00 %    Lymphocytes 36.80 22.00 - 41.00 %    Monocytes 4.60 0.00 - 13.40 %    Eosinophils 1.70 0.00 - 6.90 %    Basophils 0.70 0.00 - 1.80 %    Immature Granulocytes 0.70 0.00 - 0.90 %    Nucleated RBC 0.00 /100 WBC    Neutrophils (Absolute) 4.27 2.00 - 7.15 K/uL    Lymphs (Absolute) 2.83 1.00 - 4.80 K/uL    Monos (Absolute) 0.35 0.00 - 0.85 K/uL    Eos (Absolute) 0.13 0.00 - 0.51 K/uL    Baso (Absolute) 0.05 0.00 - 0.12 K/uL    Immature Granulocytes (abs) 0.05 0.00 - 0.11 K/uL    NRBC (Absolute) 0.00 K/uL   COMP METABOLIC PANEL    Collection Time: 04/18/18 12:30 PM   Result Value Ref Range    Sodium 138 135 - 145 mmol/L    Potassium 3.9 3.6 - 5.5 mmol/L    Chloride 103 96 - 112 mmol/L    Co2 26 20 - 33 mmol/L    Anion Gap 9.0 0.0 - 11.9    Glucose 117 (H) 65 - 99 mg/dL    Bun 10 8 - 22 mg/dL    Creatinine 0.63 0.50 - 1.40 mg/dL    Calcium 9.6 8.5 - 10.5 mg/dL    AST(SGOT) 22 12 - 45 U/L    ALT(SGPT) 14 2 - 50 U/L    Alkaline Phosphatase 67 30 - 99 U/L    Total Bilirubin 0.5 0.1 - 1.5 mg/dL    Albumin 3.8 3.2 - 4.9 g/dL    Total Protein 7.1 6.0 - 8.2 g/dL    Globulin 3.3 1.9 - 3.5 g/dL    A-G Ratio 1.2 g/dL   BTYPE NATRIURETIC PEPTIDE    Collection Time: 04/18/18 12:30 PM   Result Value Ref Range    B Natriuretic Peptide 24 0 - 100 pg/mL   TROPONIN    Collection Time: 04/18/18 12:30 PM   Result Value Ref Range    Troponin I 0.20 (H) 0.00 - 0.04 ng/mL   ESTIMATED GFR    Collection Time: 04/18/18 12:30 PM   Result Value Ref  Range    GFR If African American >60 >60 mL/min/1.73 m 2    GFR If Non African American >60 >60 mL/min/1.73 m 2   Magnesium    Collection Time: 04/18/18 12:30 PM   Result Value Ref Range    Magnesium 2.1 1.5 - 2.5 mg/dL   URINALYSIS    Collection Time: 04/18/18  3:05 PM   Result Value Ref Range    Color Yellow     Character Clear     Specific Gravity 1.006 <1.035    Ph 8.0 5.0 - 8.0    Glucose Negative Negative mg/dL    Ketones Negative Negative mg/dL    Protein Negative Negative mg/dL    Bilirubin Negative Negative    Urobilinogen, Urine 0.2 Negative    Nitrite Negative Negative    Leukocyte Esterase Negative Negative    Occult Blood Negative Negative    Micro Urine Req see below    Basic Metabolic Panel (BMP)    Collection Time: 04/18/18  5:13 PM   Result Value Ref Range    Sodium 139 135 - 145 mmol/L    Potassium 3.8 3.6 - 5.5 mmol/L    Chloride 107 96 - 112 mmol/L    Co2 23 20 - 33 mmol/L    Glucose 118 (H) 65 - 99 mg/dL    Bun 9 8 - 22 mg/dL    Creatinine 0.66 0.50 - 1.40 mg/dL    Calcium 9.5 8.5 - 10.5 mg/dL    Anion Gap 9.0 0.0 - 11.9   CBC without Differential    Collection Time: 04/18/18  5:13 PM   Result Value Ref Range    WBC 6.9 4.8 - 10.8 K/uL    RBC 5.27 4.20 - 5.40 M/uL    Hemoglobin 17.1 (H) 12.0 - 16.0 g/dL    Hematocrit 50.9 (H) 37.0 - 47.0 %    MCV 96.6 81.4 - 97.8 fL    MCH 32.4 27.0 - 33.0 pg    MCHC 33.6 33.6 - 35.0 g/dL    RDW 43.8 35.9 - 50.0 fL    Platelet Count 200 164 - 446 K/uL    MPV 10.0 9.0 - 12.9 fL   Prothrombin Time (INR)    Collection Time: 04/18/18  5:13 PM   Result Value Ref Range    PT 13.3 12.0 - 14.6 sec    INR 1.04 0.87 - 1.13   ESTIMATED GFR    Collection Time: 04/18/18  5:13 PM   Result Value Ref Range    GFR If African American >60 >60 mL/min/1.73 m 2    GFR If Non African American >60 >60 mL/min/1.73 m 2       Imaging/Procedures Review:    ndependant Imaging Review: Completed  LE VENOUS DUPLEX   Final Result      CT-HEAD W/O   Final Result      1.  Diffuse atrophy and  white matter changes.   2.  No acute intracranial hemorrhage or territorial infarct.         CT-CTA CHEST PULMONARY ARTERY W/ RECONS   Final Result      No proximal pulmonary emboli identified. The peripheral branches are not well opacified and cannot accurately be evaluated.   Dependent atelectasis/possible edema with the lungs. No pleural effusions.   Atherosclerotic changes including coronary artery calcifications.   Hepatic steatosis.            DX-CHEST-PORTABLE (1 VIEW)   Final Result      Hypoinflation without other evidence for acute cardiopulmonary disease.      DX-CHEST-2 VIEWS    (Results Pending)            EKG:   EKG Independant Review: Completed  QTc: 437, HR: 76, Normal Sinus Rhythm, no ST/T changes.     (x) Records reviewed and summarized in current documentation       Assessment/Plan     Acute coronary syndrome (CMS-HCC)- (present on admission)   Assessment & Plan    67 year old presented with chest pain, elevated troponin. BNP 24 Given patient had h/o DVT in the past, lower extremity doppler and CTA was performed. Lower extremity doppler was negative for DVT and CTA was negative for Pulmonary embolism. CXR was negative for acute infectious process. Cardiology was consulted. Patient is having NSTEMI    Plan  - Patient received 325 mg ASA already; will continue daily aspirin  - Start heparin Drip  - Per cardiology recommendations, cardiac cath (NPO with sips of meds), titrate nitroglycerin drip until chest pain free  - Morphine and tylenol for headache  - Patient started on metoprolol 25 mg        CAD S/P percutaneous coronary angioplasty- (present on admission)   Assessment & Plan    H/o CAD with 4 stent; last one placed 10 years ago. Not on medical management    Plan  - Cardiology following: plan for cardiac cath. Keep patient NPO and chest pain free with IV nitroglycerin.   - Patient started on ASA, statin, and beta-blocker        Peripheral neuropathy (CMS-[facility])- (present on admission)    Assessment & Plan    H/o peripheral neuropathy, on gabapentin and ibuprofen as outpatient    Plan  - Hold outpatient medication for now; restart once patient's cardiac issues stabilized  - Morphine and/or tylenol for pain        Tobacco dependency- (present on admission)   Assessment & Plan    Significant h/o smoking; > 50 pack years    Plan  - Smoking cessation counseling provided for patient  - Nicotine patches for patient while inpatient            .      Anticipated Hospital stay:  >2 midnights        Quality Measures  Quality-Core Measures   Reviewed items::  EKG reviewed, Radiology images reviewed, Labs reviewed and Medications reviewed  Austin catheter::  No Austin  DVT: Heparin drip for ACS.

## 2018-04-18 NOTE — ED NOTES
Bedside report from José Miguel Navarrete assumed care done.  Erp at bedside and updated pt with the poc.

## 2018-04-18 NOTE — CONSULTS
Cardiology Consult Note:    Leopoldo Moreland  Date & Time note created:    4/18/2018   4:11 PM     Referring MD:  Dr. Mclain    Patient ID:   Name:             Raine Harrison   YOB: 1950  Age:                 67 y.o.  female   MRN:               [id number]                                                             Reason for Consult:      Unstable angina    History of Present Illness:    67-year-old female with a past medical history of CAD status post stents to her RCA at some point. For the last week she's been having stuttering chest pain described as a pressure-like sensation of her chest radiating shooting an 8 out of 10 in intensity. She's been status but having at rest. She is fairly inactive and obese and does not walk or worked out very much. It finally reached the point where was constant. She was seen in the ER in [place] and transferred to return home. She was given sublingual nitro and aspirin and her chest pain came down in intensity from an 8 out of 10 to about a 6 out of 10. In the ER. EKG shows an inferior infarct which is old and no ischemic ST segment changes. She is still complaining of 6 out of 10 chest pain. She was given a nitroglycerin patch which is not helping. She underwent a CT PE E protocol which was negative. It did show plaque in the LAD and circumflex as well as an old stent in the RCA.    Of note she was essentially not taking any medications as an outpatient. It's not because she is noncompliant but simply was told to stop these. She is also an active smoker smoking about three quarters of a pack per day.    Review of Systems:      Constitutional: Denies fevers, Denies weight changes  Eyes: Denies changes in vision, no eye pain  Ears/Nose/Throat/Mouth: Denies nasal congestion or sore throat   Cardiovascular: + chest pain, no palpitations   Respiratory: no shortness of breath , Denies cough  Gastrointestinal/Hepatic: Denies abdominal pain, nausea, vomiting,  diarrhea, constipation or GI bleeding   Genitourinary: Denies dysuria or frequency  Musculoskeletal/Rheum: Denies  joint pain and swelling, no edema  Skin: Denies rash  Neurological: Denies headache, confusion, memory loss or focal weakness/parasthesias  Psychiatric: denies mood disorder   Endocrine: [place] thyroid problems  Heme/Oncology/Lymph Nodes: Denies enlarged lymph nodes, denies brusing or known bleeding disorder  All other systems were reviewed and are negative (AMA/CMS criteria)                Past Medical History:   Past Medical History:   Diagnosis Date   • Atherosclerotic heart disease of native coronary artery without angina pectoris     Coronary artery disease   • DVT (deep venous thrombosis) (CMS-HCC)    • Neuropathy (CMS-[facility])      There are no active hospital problems to display for this patient.      Past Surgical History:  Past Surgical History:   Procedure Laterality Date   • ANGIOPLASTY         Hospital Medications:  Current Facility-Administered Medications   Medication Dose   • senna-docusate (PERICOLACE or SENOKOT S) 8.6-50 MG per tablet 2 Tab  2 Tab    And   • polyethylene glycol/lytes (MIRALAX) PACKET 1 Packet  1 Packet    And   • magnesium hydroxide (MILK OF MAGNESIA) suspension 30 mL  30 mL    And   • bisacodyl (DULCOLAX) suppository 10 mg  10 mg   • Respiratory Care per Protocol     • enoxaparin (LOVENOX) inj 40 mg  40 mg   • labetalol (NORMODYNE,TRANDATE) injection 10 mg  10 mg   • ondansetron (ZOFRAN) syringe/vial injection 4 mg  4 mg   • ondansetron (ZOFRAN ODT) dispertab 4 mg  4 mg   • nicotine (NICODERM) 21 MG/24HR 21 mg  21 mg    And   • nicotine polacrilex (NICORETTE) 2 MG piece 2 mg  2 mg   • acetaminophen (TYLENOL) tablet 650 mg  650 mg   • metoprolol SR (TOPROL XL) tablet 25 mg  25 mg   • aspirin EC (ECOTRIN) tablet 81 mg  81 mg   • nitroglycerin 50 mg in D5W 250 ml infusion  0-200 mcg/min   • nitroglycerin (NITROSTAT) tablet 0.4 mg  0.4 mg   • atorvastatin (LIPITOR) tablet 80  "mg  80 mg     Current Outpatient Prescriptions   Medication   • gabapentin (NEURONTIN) 400 MG Cap   • ibuprofen (MOTRIN) 200 MG Tab   • omeprazole (PRILOSEC) 40 MG delayed-release capsule         Current Outpatient Medications:    (Not in a hospital admission)    Medication Allergy:  No Known Allergies    Family History:  No family history on file.    Social History:  Social History     Social History   • Marital status: Single     Spouse name: N/A   • Number of children: N/A   • Years of education: N/A     Occupational History   • Not on file.     Social History Main Topics   • Smoking status: Current Every Day Smoker     Packs/day: 1.00     Years: 50.00     Types: Cigarettes   • Smokeless tobacco: Never Used   • Alcohol use Yes      Comment: rare   • Drug use: No   • Sexual activity: Not on file     Other Topics Concern   • Not on file     Social History Narrative   • No narrative on file         Physical Exam:  Vitals/ General Appearance:   Weight/BMI: Body mass index is 38.95 kg/m².  Blood pressure 138/68, pulse 74, temperature 37.1 °C (98.8 °F), resp. rate 20, height 1.689 m (5' 6.5\"), weight 111.1 kg (245 lb), SpO2 96 %.  Vitals:    04/18/18 1330 04/18/18 1430 04/18/18 1530 04/18/18 1609   BP:       Pulse: 76 75 74 74   Resp:  18  20   Temp:       SpO2: 95% 95% 96% 96%   Weight:       Height:         Oxygen Therapy:  Pulse Oximetry: 96 %, O2 (LPM): 2, O2 Delivery: Nasal Cannula    Constitutional:   Well developed, Well nourished, No acute distress  HENMT:  Normocephalic, Atraumatic, Oropharynx moist mucous membranes, No oral exudates, Nose normal.  No thyromegaly.  Eyes:  EOMI, Conjunctiva normal, No discharge.  Neck:  Normal range of motion, No cervical tenderness,  no JVD.  Cardiovascular:  Normal heart rate, Normal rhythm, No murmurs, No rubs, No gallops.   Extremitites with intact distal pulses, no cyanosis, or edema.  Lungs:  Normal breath sounds, breath sounds clear to auscultation bilaterally,  no " crackles, no wheezing.   Abdomen: Bowel sounds normal, Soft, No tenderness, No guarding, No rebound, No masses, No hepatosplenomegaly.  Skin: Warm, Dry, No erythema, No rash, no induration.  Neurologic: Alert & oriented x 3, No focal deficits noted, cranial nerves II through X are grossly intact.  Psychiatric: Affect normal, Judgment normal, Mood normal.      MDM (Data Review):     Records reviewed and summarized in current documentation    Lab Data Review:  Recent Results (from the past 24 hour(s))   EKG (IP)    Collection Time: 18 12:19 PM   Result Value Ref Range    Report       [facility] Emergency [facility].    Test Date:  2018  Pt Name:    NAZANIN FALCON             Department: ER  MRN:        [id number]                      Room:       [facility]  Gender:     Female                       Technician: [id number]  :        1950                   Requested By:SANTO SALDAÑA  Order #:    [id number]                    Reading MD:    Measurements  Intervals                                Axis  Rate:       76                           P:          42  WV:         192                          QRS:        -41  QRSD:       102                          T:          95  QT:         388  QTc:        437    Interpretive Statements  SINUS RHYTHM  LEFT AXIS DEVIATION  BORDERLINE T WAVE ABNORMALITIES  No previous ECG available for comparison     CBC WITH DIFFERENTIAL    Collection Time: 18 12:30 PM   Result Value Ref Range    WBC 7.7 4.8 - 10.8 K/uL    RBC 5.35 4.20 - 5.40 M/uL    Hemoglobin 17.4 (H) 12.0 - 16.0 g/dL    Hematocrit 52.0 (H) 37.0 - 47.0 %    MCV 97.2 81.4 - 97.8 fL    MCH 32.5 27.0 - 33.0 pg    MCHC 33.5 (L) 33.6 - 35.0 g/dL    RDW 44.2 35.9 - 50.0 fL    Platelet Count 216 164 - 446 K/uL    MPV 10.5 9.0 - 12.9 fL    Neutrophils-Polys 55.50 44.00 - 72.00 %    Lymphocytes 36.80 22.00 - 41.00 %    Monocytes 4.60 0.00 - 13.40 %    Eosinophils 1.70 0.00 - 6.90 %    Basophils 0.70 0.00 -  1.80 %    Immature Granulocytes 0.70 0.00 - 0.90 %    Nucleated RBC 0.00 /100 WBC    Neutrophils (Absolute) 4.27 2.00 - 7.15 K/uL    Lymphs (Absolute) 2.83 1.00 - 4.80 K/uL    Monos (Absolute) 0.35 0.00 - 0.85 K/uL    Eos (Absolute) 0.13 0.00 - 0.51 K/uL    Baso (Absolute) 0.05 0.00 - 0.12 K/uL    Immature Granulocytes (abs) 0.05 0.00 - 0.11 K/uL    NRBC (Absolute) 0.00 K/uL   COMP METABOLIC PANEL    Collection Time: 04/18/18 12:30 PM   Result Value Ref Range    Sodium 138 135 - 145 mmol/L    Potassium 3.9 3.6 - 5.5 mmol/L    Chloride 103 96 - 112 mmol/L    Co2 26 20 - 33 mmol/L    Anion Gap 9.0 0.0 - 11.9    Glucose 117 (H) 65 - 99 mg/dL    Bun 10 8 - 22 mg/dL    Creatinine 0.63 0.50 - 1.40 mg/dL    Calcium 9.6 8.5 - 10.5 mg/dL    AST(SGOT) 22 12 - 45 U/L    ALT(SGPT) 14 2 - 50 U/L    Alkaline Phosphatase 67 30 - 99 U/L    Total Bilirubin 0.5 0.1 - 1.5 mg/dL    Albumin 3.8 3.2 - 4.9 g/dL    Total Protein 7.1 6.0 - 8.2 g/dL    Globulin 3.3 1.9 - 3.5 g/dL    A-G Ratio 1.2 g/dL   BTYPE NATRIURETIC PEPTIDE    Collection Time: 04/18/18 12:30 PM   Result Value Ref Range    B Natriuretic Peptide 24 0 - 100 pg/mL   TROPONIN    Collection Time: 04/18/18 12:30 PM   Result Value Ref Range    Troponin I 0.20 (H) 0.00 - 0.04 ng/mL   ESTIMATED GFR    Collection Time: 04/18/18 12:30 PM   Result Value Ref Range    GFR If African American >60 >60 mL/min/1.73 m 2    GFR If Non African American >60 >60 mL/min/1.73 m 2   Magnesium    Collection Time: 04/18/18 12:30 PM   Result Value Ref Range    Magnesium 2.1 1.5 - 2.5 mg/dL   URINALYSIS    Collection Time: 04/18/18  3:05 PM   Result Value Ref Range    Color Yellow     Character Clear     Specific Gravity 1.006 <1.035    Ph 8.0 5.0 - 8.0    Glucose Negative Negative mg/dL    Ketones Negative Negative mg/dL    Protein Negative Negative mg/dL    Bilirubin Negative Negative    Urobilinogen, Urine 0.2 Negative    Nitrite Negative Negative    Leukocyte Esterase Negative Negative    Occult  Blood Negative Negative    Micro Urine Req see below        Imaging/Procedures Review:    Chest Xray:  Reviewed    EKG:   Personally reviewed by myself showing normal sinus rhythm with inferior infarct otherwise no skin ST segment changes.    ECHO:  n/a  MDM (Assessment and Plan):     There are no active hospital problems to display for this patient.    67-year-old female with unstable angina. She is alert he received aspirin and heparin drip. We will start a nitro drip to get her chest pain free.  If we cannot get her chest pain under control we will send her for an urgent cardiac catheterization.    1. Unstable angina    - start nitro drip with bolus    - asa    - heparin drip    - atorva 80    2. CAD s/p stent to RCA    - per above    3. Tob Use    - encourage cessation    4. Polycythemia    - likely from COPD or ANNIE    - defer    Thank for you allowing me to take part in your patient's care, please call should you have any questions or would like to discuss this patient.    Addendum:  Nitro drip was started and titrated.  She was bolused with SL nitro to get her chest pain free.  She developed a nitro headache.  Her BP could not tolerate any increase in the nitro drip or bolus.  She will be taken to the cardiac cath lab for further diagnosis.      This patient is critically ill with a life threatening illness as noted above requiring my direct presence, involvement and maximal preparedness. I have personally spend 60 minutes providing critical care to this patient. Time spend on critical care excludes time spend on procedures.

## 2018-04-19 LAB
ANION GAP SERPL CALC-SCNC: 4 MMOL/L (ref 0–11.9)
APTT PPP: 51.8 SEC (ref 24.7–36)
APTT PPP: 60.6 SEC (ref 24.7–36)
APTT PPP: 86.7 SEC (ref 24.7–36)
BASOPHILS # BLD AUTO: 0.3 % (ref 0–1.8)
BASOPHILS # BLD: 0.02 K/UL (ref 0–0.12)
BUN SERPL-MCNC: 8 MG/DL (ref 8–22)
CALCIUM SERPL-MCNC: 8.6 MG/DL (ref 8.5–10.5)
CHLORIDE SERPL-SCNC: 107 MMOL/L (ref 96–112)
CO2 SERPL-SCNC: 26 MMOL/L (ref 20–33)
CREAT SERPL-MCNC: 0.6 MG/DL (ref 0.5–1.4)
EOSINOPHIL # BLD AUTO: 0.14 K/UL (ref 0–0.51)
EOSINOPHIL NFR BLD: 2 % (ref 0–6.9)
ERYTHROCYTE [DISTWIDTH] IN BLOOD BY AUTOMATED COUNT: 43.7 FL (ref 35.9–50)
GLUCOSE SERPL-MCNC: 100 MG/DL (ref 65–99)
HCT VFR BLD AUTO: 46.6 % (ref 37–47)
HGB BLD-MCNC: 15.6 G/DL (ref 12–16)
IMM GRANULOCYTES # BLD AUTO: 0.03 K/UL (ref 0–0.11)
IMM GRANULOCYTES NFR BLD AUTO: 0.4 % (ref 0–0.9)
LYMPHOCYTES # BLD AUTO: 2.39 K/UL (ref 1–4.8)
LYMPHOCYTES NFR BLD: 34.2 % (ref 22–41)
MAGNESIUM SERPL-MCNC: 1.8 MG/DL (ref 1.5–2.5)
MCH RBC QN AUTO: 32.4 PG (ref 27–33)
MCHC RBC AUTO-ENTMCNC: 33.5 G/DL (ref 33.6–35)
MCV RBC AUTO: 96.9 FL (ref 81.4–97.8)
MONOCYTES # BLD AUTO: 0.43 K/UL (ref 0–0.85)
MONOCYTES NFR BLD AUTO: 6.2 % (ref 0–13.4)
NEUTROPHILS # BLD AUTO: 3.98 K/UL (ref 2–7.15)
NEUTROPHILS NFR BLD: 56.9 % (ref 44–72)
NRBC # BLD AUTO: 0 K/UL
NRBC BLD-RTO: 0 /100 WBC
PLATELET # BLD AUTO: 154 K/UL (ref 164–446)
PMV BLD AUTO: 10.2 FL (ref 9–12.9)
POTASSIUM SERPL-SCNC: 3.7 MMOL/L (ref 3.6–5.5)
RBC # BLD AUTO: 4.81 M/UL (ref 4.2–5.4)
SODIUM SERPL-SCNC: 137 MMOL/L (ref 135–145)
WBC # BLD AUTO: 7 K/UL (ref 4.8–10.8)

## 2018-04-19 PROCEDURE — 770020 HCHG ROOM/CARE - TELE (206)

## 2018-04-19 PROCEDURE — A9270 NON-COVERED ITEM OR SERVICE: HCPCS | Performed by: STUDENT IN AN ORGANIZED HEALTH CARE EDUCATION/TRAINING PROGRAM

## 2018-04-19 PROCEDURE — 83735 ASSAY OF MAGNESIUM: CPT

## 2018-04-19 PROCEDURE — 85730 THROMBOPLASTIN TIME PARTIAL: CPT

## 2018-04-19 PROCEDURE — 700102 HCHG RX REV CODE 250 W/ 637 OVERRIDE(OP): Performed by: INTERNAL MEDICINE

## 2018-04-19 PROCEDURE — A9270 NON-COVERED ITEM OR SERVICE: HCPCS | Performed by: INTERNAL MEDICINE

## 2018-04-19 PROCEDURE — 700102 HCHG RX REV CODE 250 W/ 637 OVERRIDE(OP): Performed by: STUDENT IN AN ORGANIZED HEALTH CARE EDUCATION/TRAINING PROGRAM

## 2018-04-19 PROCEDURE — A9270 NON-COVERED ITEM OR SERVICE: HCPCS | Performed by: HOSPITALIST

## 2018-04-19 PROCEDURE — 80048 BASIC METABOLIC PNL TOTAL CA: CPT

## 2018-04-19 PROCEDURE — 97161 PT EVAL LOW COMPLEX 20 MIN: CPT

## 2018-04-19 PROCEDURE — 700102 HCHG RX REV CODE 250 W/ 637 OVERRIDE(OP): Performed by: HOSPITALIST

## 2018-04-19 PROCEDURE — G8979 MOBILITY GOAL STATUS: HCPCS | Mod: CI

## 2018-04-19 PROCEDURE — 700111 HCHG RX REV CODE 636 W/ 250 OVERRIDE (IP): Performed by: HOSPITALIST

## 2018-04-19 PROCEDURE — G8980 MOBILITY D/C STATUS: HCPCS | Mod: CI

## 2018-04-19 PROCEDURE — G8978 MOBILITY CURRENT STATUS: HCPCS | Mod: CI

## 2018-04-19 PROCEDURE — 85025 COMPLETE CBC W/AUTO DIFF WBC: CPT

## 2018-04-19 RX ORDER — GABAPENTIN 400 MG/1
400 CAPSULE ORAL ONCE
Status: COMPLETED | OUTPATIENT
Start: 2018-04-19 | End: 2018-04-19

## 2018-04-19 RX ORDER — GABAPENTIN 400 MG/1
800 CAPSULE ORAL 3 TIMES DAILY
Status: DISCONTINUED | OUTPATIENT
Start: 2018-04-19 | End: 2018-04-20 | Stop reason: HOSPADM

## 2018-04-19 RX ADMIN — GABAPENTIN 800 MG: 400 CAPSULE ORAL at 20:18

## 2018-04-19 RX ADMIN — ATORVASTATIN CALCIUM 80 MG: 80 TABLET, FILM COATED ORAL at 20:18

## 2018-04-19 RX ADMIN — ACETAMINOPHEN 650 MG: 325 TABLET, FILM COATED ORAL at 20:14

## 2018-04-19 RX ADMIN — GABAPENTIN 800 MG: 400 CAPSULE ORAL at 08:44

## 2018-04-19 RX ADMIN — HEPARIN SODIUM 3200 UNITS: 1000 INJECTION, SOLUTION INTRAVENOUS; SUBCUTANEOUS at 01:36

## 2018-04-19 RX ADMIN — GABAPENTIN 400 MG: 400 CAPSULE ORAL at 02:22

## 2018-04-19 RX ADMIN — ASPIRIN 81 MG: 81 TABLET, COATED ORAL at 08:44

## 2018-04-19 RX ADMIN — METOPROLOL TARTRATE 25 MG: 25 TABLET, FILM COATED ORAL at 08:44

## 2018-04-19 RX ADMIN — METOPROLOL TARTRATE 25 MG: 25 TABLET, FILM COATED ORAL at 20:18

## 2018-04-19 RX ADMIN — HEPARIN SODIUM 1450 UNITS/HR: 5000 INJECTION, SOLUTION INTRAVENOUS; SUBCUTANEOUS at 12:58

## 2018-04-19 RX ADMIN — GABAPENTIN 800 MG: 400 CAPSULE ORAL at 14:48

## 2018-04-19 ASSESSMENT — ENCOUNTER SYMPTOMS
DIZZINESS: 0
SHORTNESS OF BREATH: 0
SORE THROAT: 0
SPUTUM PRODUCTION: 0
PALPITATIONS: 0
PND: 0
LOSS OF CONSCIOUSNESS: 0
FEVER: 0
EYE PAIN: 0
RESPIRATORY NEGATIVE: 1
WHEEZING: 0
BLURRED VISION: 0
NEUROLOGICAL NEGATIVE: 1
SEIZURES: 0
CHILLS: 0
HEMOPTYSIS: 0
ABDOMINAL PAIN: 0
WEAKNESS: 0
CARDIOVASCULAR NEGATIVE: 1
STRIDOR: 0
FALLS: 0
EYES NEGATIVE: 1
GASTROINTESTINAL NEGATIVE: 1
COUGH: 0
CLAUDICATION: 0
BRUISES/BLEEDS EASILY: 0
ORTHOPNEA: 0
MUSCULOSKELETAL NEGATIVE: 1
CONSTITUTIONAL NEGATIVE: 1
VOMITING: 0

## 2018-04-19 ASSESSMENT — PAIN SCALES - GENERAL
PAINLEVEL_OUTOF10: 0
PAINLEVEL_OUTOF10: 2
PAINLEVEL_OUTOF10: 0
PAINLEVEL_OUTOF10: 0
PAINLEVEL_OUTOF10: 3
PAINLEVEL_OUTOF10: 2
PAINLEVEL_OUTOF10: 0
PAINLEVEL_OUTOF10: ASSUMED PAIN PRESENT

## 2018-04-19 ASSESSMENT — COGNITIVE AND FUNCTIONAL STATUS - GENERAL
MOBILITY SCORE: 24
SUGGESTED CMS G CODE MODIFIER MOBILITY: CH

## 2018-04-19 ASSESSMENT — GAIT ASSESSMENTS
ASSISTIVE DEVICE: SINGLE POINT CANE
GAIT LEVEL OF ASSIST: SUPERVISED
DISTANCE (FEET): 200

## 2018-04-19 NOTE — ASSESSMENT & PLAN NOTE
H/o peripheral neuropathy, on gabapentin and ibuprofen as outpatient    Plan  - Restart home gabapentin 4/19/2018 at 800 mg TID   - Morphine and/or tylenol for pain

## 2018-04-19 NOTE — CARE PLAN
Problem: Safety  Goal: Will remain free from falls  Outcome: PROGRESSING AS EXPECTED  Pt educated regarding the use of call light when needing assistance. Pt demonstrated and verbalized the proper use of a call light and to call for assistance. Fall precautions in place, treaded slippers on, personal belongings/phone in reach. Pt is steady transferring to  commode and is encouraged to call if assistance is needed.       Problem: Knowledge Deficit  Goal: Knowledge of disease process/condition, treatment plan, diagnostic tests, and medications will improve  Outcome: PROGRESSING SLOWER THAN EXPECTED  Pt educated regarding medications and POC-- pt irritable tonight and refused teaching.     Problem: Pain Management  Goal: Pain level will decrease to patient's comfort goal  Outcome: PROGRESSING SLOWER THAN EXPECTED  Pt denies any chest pain tonight, but c/o neuropathy in BLE-- gabapentin given tonight.

## 2018-04-19 NOTE — CONSULTS
DATE OF SERVICE:  04/18/2018    REQUESTING PHYSICIAN:  Glenn Dsouza DO    CONSULTING PHYSICIAN:  Ben Stephenson MD    TYPE OF CONSULTATION:  Pulmonary medicine and critical care medicine.    REASON FOR CONSULTATION:  Evaluation and management of unstable angina and   non-ST segment elevation myocardial infarction.    CHIEF COMPLAINT:  Chest pain.    HISTORY OF PRESENT ILLNESS:  I was kindly asked by Dr. Dsouza to see and   evaluate this lady regarding the above problem.  This is a 67-year-old lady   with a history of coronary artery disease and prior percutaneous coronary   intervention.  She presented to the emergency room today with chest pain.  She   describes the pain as a heaviness and pressure in the center of her chest,   which radiates to the left lateral chest as well as her left shoulder, left   arm and the left side of her neck.  There was no dyspnea, nausea, vomiting or   diaphoresis associated with the pain.  She presented to the emergency room,   she received sublingual nitroglycerin as well as intravenous nitroglycerin and   ultimately had relief of her chest pain.  She was then emergently taken to   the cardiac catheterization laboratory where she was found to have severe   3-vessel coronary artery disease including her left anterior descending   artery, mid vessel in-stent restenosis with chronic total occlusion of the   distal right coronary artery with severe extensive distal disease, severe   stenosis of her circumflex artery and right coronary artery.  Her left   ventricular ejection fraction was 60%.  No percutaneous coronary intervention   was performed due to her difficult anatomy.  She is now in the cardiac   intensive care unit and I have been asked to assist in her care and   management.    At this time, she is chest pain free.    CURRENT MEDICATIONS:  She is on gabapentin 800 mg 4 times a day, ibuprofen 800   mg twice a day, omeprazole 40 mg a day.    ALLERGIES:  No known  drug allergies.    PAST SURGICAL HISTORY:  She has had percutaneous coronary intervention.    ILLNESSES:  Coronary artery disease, remote deep venous thrombosis, peripheral   neuropathy.    SOCIAL HISTORY:  She smokes pack of cigarettes a day.  She rarely drinks   alcohol.    FAMILY HISTORY:  Noncontributory.    REVIEW OF SYSTEMS:  The AMA and CMS system review does not reveal additional   significant positive findings.    PHYSICAL EXAMINATION:  VITAL SIGNS:  Her temperature is 98.8, blood pressure 109/65, heart rate 66,   respiratory rate is 18.  GENERAL:  Well-developed, well-nourished lady.  HEENT:  Normocephalic, atraumatic.  Sinuses are nontender.  Nares patent.    Oropharynx with moist mucous membranes.  NECK:  Trachea midline, supple.  CHEST:  Symmetrical.  HEART:  Regular rhythm.  Normal S1, S2.  No murmurs, gallops or rubs.  LUNGS:  Clear to auscultation and percussion.  ABDOMEN:  Soft, nondistended, nontender.  No masses.  EXTREMITIES:  No clubbing, cyanosis or edema.  NEUROLOGIC:  She is awake, alert, oriented to person, place and time.  Cranial   nerves II-XII grossly intact.  No focal motor deficits.    DIAGNOSTIC DATA:  Her white blood cell count is 6900, hemoglobin 17.1,   hematocrit 50.9, platelet count 200,000.  Sodium 139, potassium 3.8, chloride   107, CO2 23, BUN 9, creatinine 0.66, glucose 118.  Troponin 0.34.  CT   angiogram of the chest is personally reviewed.  There is no evidence of   pulmonary embolism.  CT scan of the head is personally reviewed and reveals no   acute pathology.  I also personally reviewed her chest x-ray, the lungs are   clear.  Lower extremity venous Doppler study shows no evidence of deep venous   thrombosis.  Her electrocardiogram shows sinus rhythm, no definite acute   ischemic changes.    IMPRESSION:  1.  Acute non-ST segment elevation myocardial infarction.  2.  Unstable angina.  3.  Severe 3-vessel coronary artery disease.  This was not amenable to   percutaneous  coronary intervention.  4.  Remote history of deep venous thrombosis.  5.  Peripheral neuropathy.  6.  Tobacco abuse.    PLAN/MEDICAL DECISION MAKING:  This lady is critically ill.  She is now in the   cardiac intensive care unit.  I am going to place her on a heparin drip.  I   will also place her on a nitroglycerin drip.  She is on aspirin and a high   intensity statin.  We will place her on metoprolol XR 25 mg a day.  She will   have continuous electrocardiographic monitoring.  At the current time, this   lady's prognosis is quite guarded and she is critically ill.  I have spent a   total of 35 minutes providing direct critical care services at the bedside.    There has been no time overlap.  The time spent excludes the time spent   performing procedures (02449).    I have assessed and reassessed her blood pressure, hemodynamics, and   cardiovascular status.  She is at increased risk for worsening cardiovascular   system dysfunction as well as death.    The case has been reviewed with Dr. Chauhan, nursing and respiratory therapy.    Thank you Dr. Chauhan for allowing us to participate in the care of this lady.    We will continue to follow her with great interest.       ____________________________________     MD MATILDA SERRANO / WILEY    DD:  04/19/2018 01:22:46  DT:  04/19/2018 01:42:40    D#:  [id number]  Job#:  [id number]    cc: KAVITHA CHAUHAN DO

## 2018-04-19 NOTE — ASSESSMENT & PLAN NOTE
67 year old presented with chest pain, elevated troponin. BNP 24. EKG was negative for acute ischemic ST changes. Given patient had h/o DVT in the past, lower extremity doppler and CTA was performed. Lower extremity doppler was negative for DVT and CTA was negative for Pulmonary embolism. Cardiology was consulted. Patient is having NSTEMI. Cath 4/18/2018 shows advanced sever disease.     Plan  - Con aspirin 81  - Heparin Drip  - Metoprolol 25 mg QD  - Atorvastatin 80 mg    - S/P nitroglycerin drip   - Cardiothoracic surgery patient is not a good candidate for CABG, will like need prolonged intubation and rehab, accordingly the patient will go with medical therapy.   - Morphine and tylenol for pain

## 2018-04-19 NOTE — PROCEDURES
DATE OF SERVICE:  04/18/2018    PROCEDURE:  Cardiac catheterization.  A.  Left heart catheterization.  B.  Left ventriculography.  C.  Selective coronary angiography.  D.  Right radial artery approach.  E.  Conscious sedation supervision 19 minutes.    PRE-PROCEDURE DIAGNOSES:  1.  Unstable angina pectoris with intractable symptoms.  2.  Coronary artery disease, previous coronary intervention 10 years ago.    POST-PROCEDURE DIAGNOSES:  1.  Coronary artery disease, severe, 3-vessel including left anterior   descending artery mid vessel in-stent restenosis followed by chronic total occlusion with  severe extensive diffuse distal disease, severe stenoses of the circumflex artery, right   coronary artery with mid vessel chronic total occlusion, severe in-stent restenosis with   left-to-right collateral circulation.  2.  Left ventricular ejection fraction 60% post-PVC with akinesis of the   proximal inferior wall.    PHYSICIAN:  Yovanny Munoz MD    REFERRING PHYSICIAN:  Leopoldo Moreland MD    COMPLICATIONS:  None.    INDICATIONS:  The patient is a 67-year-old female with a history of coronary   artery disease, previous myocardial infarction and coronary intervention 10   years ago in [place], [place], chronic tobacco use, having not seen a   cardiologist in 7 years on no medications prior to presenting to [facility] with unstable angina pectoris and refractory symptoms despite   intravenous nitroglycerin and intravenous heparin therapy.  The patient   referred for diagnostic cardiac catheterization.    DESCRIPTION OF PROCEDURE:  After informed consent was obtained, the patient   was brought to the cardiac catheterization laboratory where she was prepped,   draped, and anesthetized in the usual manner.    After having reestablished adequate collateral circulation to the right hand   with a pressure and oximetry-guided Benny's test, the volar surface of the   right wrist was prepped, draped, and  anesthetized in the usual manner.    Using a modified Seldinger technique, a 6-Dominican x 10 cm introducer sheath was   inserted in the right radial artery.  Nitroglycerin and verapamil were given   via the side port.    Next, a 4-Dominican JL 3.5 left coronary catheter was inserted in the ostium of   the left coronary artery and left coronary angiograms were obtained in various   projections.    Next, a 4-Dominican 3D right coronary catheter was inserted in the ostium of the   right coronary and right coronary angiograms were obtained in various   projections.    Next, a 4-Dominican pigtail catheter was inserted in the left ventricle under   fluoroscopic guidance.  A single plane LARA left ventricular angiogram was   performed.  Pre and postangiogram LVEDP, LV, and aortic pressures were   obtained.    At the end of procedure, catheters were removed.  Hemostasis was achieved with   a wrist band device.  Patient tolerated the procedure well.    FINDINGS:  HEMODYNAMICS:  Left heart pressures:  1.  LVEDP of 19 mmHg.  2.  Left ventricular systolic pressure 141 mmHg.  3.  Central aortic pressure systolic 147, diastolic 74, mean of 105 mmHg.    LEFT VENTRICULOGRAPHY:  Left ventricular chamber size appears normal with   akinesis of the basilar inferior wall.  Estimated left ventricular ejection   fraction post-PVC is 60%.    CORONARY ARTERIOGRAPHY:  1.  Left main artery:  Left main is a large caliber vessel, angiographically   normal, bifurcates to left anterior descending artery and circumflex artery.  2.  Left anterior descending artery:  Left anterior descending artery gives   rise to a first diagonal branch immediately after which the mid vessel stent   has significant diffuse in-stent restenosis followed by a 100% chronic total   occlusion with antegrade collateral circulation with extensive severe calcific  obstructive disease throughout the entire distal vessel extending to the   apex.  3.  Circumflex artery:  The circumflex  gives rise to a large bifurcating   marginal branch and a distal small posterolateral branch.  The superior limb of   the first marginal branch has an ostial 95% stenosis and the inferior branch has a   90% proximal stenosis.  The distal circumflex has 90% stenosis followed by   series of tiny caliber posterolateral branches.    4.  Right coronary artery: The right coronary artery has a mid vessel 100%   chronic total occlusion followed by obstruction of a long distal stented segment.    There is left-to-right collateral circulation filling the distal right coronary artery.    PLAN:  1.  Admit to CCU.  2.  Optimize maximal medical therapy including continuation of IV   nitroglycerin as tolerated and IV heparin.    3. Cardiothoracic surgical consultation however the patient does not appear to   be an optimal candidate for surgical revascularization primarily due to the lack   of adequate target of the distal left anterior descending artery though it is unlikely  that the circumflex artery could be completely revascularized with bypass grafts.  3.  Further options could be discussed with the patient as far as  consideration of partial revascularization with catheter-based intervention primarily  in the circumflex distribution.         ____________________________________     MD CORNELIA ALEX / NTS    DD:  04/18/2018 19:06:48  DT:  04/18/2018 19:44:04    D#:  [id number]  Job#:  [id number]

## 2018-04-19 NOTE — PROGRESS NOTES
[facility] Progress Note      Admit Date: 4/18/2018  ICU Day: 2    Resident(s): Shobha Hernandez  Attending: ESTEFANIA MOSER/ PUNEET Sheridan     Date & Time:   4/19/2018   7:13 AM       Patient ID:    Name:             Raine Harrison   YOB: 1950  Age:                 67 y.o.  female   MRN:               [id number]    HPI:  67-year-old female with a past medical history of CAD, stents at RCA, Hx DVT, and active tobacco user, and medication noncompliance  admitted 4/18/2018 for unstable angina. initial EKG significant for old Q-wave, without ST segment depressions or elevations. Troponin was elevated at 0.20. Cardiology were consulted, patient started nitro and heparin drips. Lower extremity doppler and CT PE was negative for Pulmonary embolism & DVT. Cath 4/18/2018 showed mid vessel in-stent restenosis of left anterior descending artery,  with chronic total occlusionof the distal right coronary artery, and , severe stenoses of the circumflex artery, right coronary artery, mid vessel, and chronic total occlusion with severe in-stent restenosis with left-to-right collateral circulation. Left ventricular ejection fraction 60% post-PVC with akinesis of the proximal inferior wall.    Consultants:  Cardiology   PMA:     Procedures:  Cath 4/18/2018     Interval Events:    4/19/2018   Hemodynamically stable. Chest pain free. Restarted home gabapentin.  Cardiology following, appreciates questionable candidacy for CABG, Cardiothoracic surgery will evaluate for CABG    Review of Systems   Constitutional: Negative for chills and fever.   HENT: Negative for sore throat.    Eyes: Negative for blurred vision and pain.   Respiratory: Negative for sputum production, shortness of breath and stridor.    Cardiovascular: Negative for chest pain and palpitations.   Gastrointestinal: Negative for abdominal pain and vomiting.   Genitourinary: Negative for hematuria and urgency.   Musculoskeletal: Negative for falls.    Skin: Negative for rash.   Neurological: Negative for seizures and loss of consciousness.       Physical Exam   Constitutional: She is oriented to person, place, and time. No distress.   HENT:   Head: Normocephalic and atraumatic.   Eyes: Pupils are equal, round, and reactive to light.   Neck: No JVD present.   Cardiovascular: Normal rate and regular rhythm.  Exam reveals no gallop and no friction rub.    No murmur heard.  Pulmonary/Chest: Effort normal. No stridor. No respiratory distress. She has no wheezes. She has no rales.   Abdominal: Soft. She exhibits no distension. There is no tenderness. There is no rebound.   Neurological: She is alert and oriented to person, place, and time. No cranial nerve deficit.   Skin: Skin is warm and dry. She is not diaphoretic.   Psychiatric: She has a normal mood and affect.               Invalid input(s): [id number]    HemoDynamics:  Pulse: 68, Heart Rate (Monitored): 69 Blood Pressure : 138/68, NIBP: 147/70          Intake/Output Summary (Last 24 hours) at 04/19/18 0631  Last data filed at 04/19/18 0600   Gross per 24 hour   Intake          1789.25 ml   Output             1000 ml   Net           789.25 ml       Weight: 114.6 kg (252 lb 10.4 oz)  Body mass index is 40.17 kg/m².    Recent Labs      04/18/18   1230  04/18/18   1713  04/19/18   0520   SODIUM  138  139  137   POTASSIUM  3.9  3.8  3.7   CHLORIDE  103  107  107   CO2  26  23  26   BUN  10  9  8   CREATININE  0.63  0.66  0.60   MAGNESIUM  2.1   --   1.8   CALCIUM  9.6  9.5  8.6       GI/Nutrition:  Recent Labs      04/18/18   1230  04/18/18   1713  04/19/18   0520   ALTSGPT  14   --    --    ASTSGOT  22   --    --    ALKPHOSPHAT  67   --    --    TBILIRUBIN  0.5   --    --    GLUCOSE  117*  118*  100*       Heme:  Recent Labs      04/18/18   1230  04/18/18   1713  04/18/18   2345  04/19/18   0520   RBC  5.35  5.27   --   4.81   HEMOGLOBIN  17.4*  17.1*   --   15.6   HEMATOCRIT  52.0*  50.9*   --   46.6    PLATELETCT  216  200   --   154*   PROTHROMBTM   --   13.3   --    --    APTT   --    --   51.8*   --    INR   --   1.04   --    --        Infectious Disease:  Temp  Av.7 °C (98 °F)  Min: 36.4 °C (97.5 °F)  Max: 37.1 °C (98.8 °F)  Recent Labs      18   1230  18   1713  18   0520   WBC  7.7  6.9  7.0   NEUTSPOLYS  55.50   --   56.90   LYMPHOCYTES  36.80   --   34.20   MONOCYTES  4.60   --   6.20   EOSINOPHILS  1.70   --   2.00   BASOPHILS  0.70   --   0.30   ASTSGOT  22   --    --    ALTSGPT  14   --    --    ALKPHOSPHAT  67   --    --    TBILIRUBIN  0.5   --    --        Meds:  • gabapentin  800 mg     • senna-docusate  2 Tab      And   • polyethylene glycol/lytes  1 Packet      And   • magnesium hydroxide  30 mL      And   • bisacodyl  10 mg     • Respiratory Care per Protocol       • labetalol  10 mg     • ondansetron  4 mg     • nicotine  21 mg      And   • nicotine polacrilex  2 mg     • acetaminophen  650 mg     • metoprolol SR  25 mg     • aspirin EC  81 mg     • nitroglycerin in D5W  0-200 mcg/min Stopped (18 1831)   • nitroglycerin  0.4 mg     • atorvastatin  80 mg     • morphine injection  4 mg     • heparin  3,200 Units      And   • heparin   1,450 Units/hr (18 0137)   • NS   75 mL/hr at 18 2048          Acute coronary syndrome (CMS-HCC)  67 year old presented with chest pain, elevated troponin. BNP 24. EKG was negative for acute ischemic ST changes. Given patient had h/o DVT in the past, lower extremity doppler and CTA was performed. Lower extremity doppler was negative for DVT and CTA was negative for Pulmonary embolism. Cardiology was consulted. Patient is having NSTEMI. Cath 2018 shows advanced sever disease.     Plan  - Con aspirin 81  - Heparin Drip  - Metoprolol 25 mg QD  - Atorvastatin 80 mg    - Titrate nitroglycerin drip until chest pain free  - May need CABG  - Morphine and tylenol for pain       CAD S/P percutaneous coronary angioplasty  H/o CAD  with 4 stent; last one placed 10 years ago.      - Con aspirin 81  - Heparin Drip  - Metoprolol 25 mg QD  - Atorvastatin 80 mg    - Titrate nitroglycerin drip until chest pain free  - May need CABG  - Morphine and tylenol for pain     Tobacco dependency  Significant h/o smoking; > 50 pack years    Plan  - Smoking cessation counseling provided for patient  - Nicotine patches for patient while inpatient     Peripheral neuropathy (CMS-HCC)  H/o peripheral neuropathy, on gabapentin and ibuprofen as outpatient    Plan  - Restart home gabapentin 4/19/2018 at 800 mg TID   - Morphine and/or tylenol for pain     Mild hypokalemia and hypomagnesia   Replacing PRN   CTM    Quality Measures:  DVT Prophylaxis:  On Heparin drip    Antibiotics:  N/A  Lines:   PIV  DISPO:   Inpatient  CODE STATUS:  FULL

## 2018-04-19 NOTE — THERAPY
"Physical Therapy Evaluation completed.   Bed Mobility:  Supine to Sit: Supervised  Transfers: Sit to Stand: Supervised  Gait: Level Of Assist: Supervised with Single Point Cane       Plan of Care: Patient with no further skilled PT needs in the acute care setting at this time  Discharge Recommendations: Equipment: No Equipment Needed.       See \"Rehab Therapy-Acute\" Patient Summary Report for complete documentation.     "

## 2018-04-19 NOTE — CARE PLAN
Problem: Fluid Volume:  Goal: Will maintain balanced intake and output    Intervention: Monitor, educate, and encourage compliance with therapeutic intake of liquids  Pt oral intake adequate. Pt frequents the bathroom for urination. BP stable and adequate. Pt alert and awake, steady of feet. NS IV fluids discontinued.

## 2018-04-19 NOTE — PROGRESS NOTES
Cardiology Progress Note               Author: Leopoldo Moreland Date & Time created: 4/19/2018  10:43 AM     Interval History:  Cath yesterday showing severe diffuse disease  No further chest pain  Still in heparin drip  Trop mildly positive    Chief Complaint:  ACS    Review of Systems   Constitutional: Negative.  Negative for chills, fever and malaise/fatigue.   HENT: Negative.  Negative for sore throat.    Eyes: Negative.    Respiratory: Negative.  Negative for cough, hemoptysis, sputum production, shortness of breath, wheezing and stridor.    Cardiovascular: Negative.  Negative for chest pain, palpitations, orthopnea, claudication, leg swelling and PND.   Gastrointestinal: Negative.    Genitourinary: Negative.    Musculoskeletal: Negative.    Skin: Negative.    Neurological: Negative.  Negative for dizziness, loss of consciousness and weakness.   Endo/Heme/Allergies: Negative.  Does not bruise/bleed easily.   All other systems reviewed and are negative.      Physical Exam   Constitutional: She is oriented to person, place, and time. She appears well-developed and well-nourished. No distress.   HENT:   Head: Normocephalic.   Mouth/Throat: Oropharynx is clear and moist.   Eyes: EOM are normal. Pupils are equal, round, and reactive to light. Right eye exhibits no discharge. Left eye exhibits no discharge. No scleral icterus.   Neck: Normal range of motion. Neck supple. No JVD present. No tracheal deviation present.   Cardiovascular: Normal rate, regular rhythm, S1 normal, S2 normal, normal heart sounds, intact distal pulses and normal pulses.  Exam reveals no gallop, no S3, no S4 and no friction rub.    No murmur heard.   No systolic murmur is present    No diastolic murmur is present   Pulses:       Carotid pulses are 2+ on the right side, and 2+ on the left side.       Radial pulses are 2+ on the right side, and 2+ on the left side.        Dorsalis pedis pulses are 2+ on the right side, and 2+ on the left  side.        Posterior tibial pulses are 2+ on the right side, and 2+ on the left side.   Pulmonary/Chest: Effort normal and breath sounds normal. No respiratory distress. She has no wheezes. She has no rales.   Abdominal: Soft. Bowel sounds are normal. She exhibits no distension and no mass. There is no tenderness. There is no rebound and no guarding.   Musculoskeletal: She exhibits no edema.   Neurological: She is alert and oriented to person, place, and time. No cranial nerve deficit.   Skin: Skin is warm and dry. She is not diaphoretic. No pallor.   Psychiatric: She has a normal mood and affect. Her behavior is normal. Judgment and thought content normal.   Nursing note and vitals reviewed.      Hemodynamics:  Temp (24hrs), Av.7 °C (98 °F), Min:36.4 °C (97.5 °F), Max:37.1 °C (98.8 °F)  Temperature: 36.6 °C (97.8 °F)  Pulse  Av.2  Min: 66  Max: 99Heart Rate (Monitored): 69  Blood Pressure : 138/68, NIBP: 147/70     Respiratory:    Respiration: 16, Pulse Oximetry: 93 %, O2 Daily Delivery Respiratory : Silicone Nasal Cannula        RUL Breath Sounds: Diminished, RML Breath Sounds: Diminished, RLL Breath Sounds: Diminished, PREETI Breath Sounds: Diminished, LLL Breath Sounds: Diminished  Fluids:     Weight: 114.6 kg (252 lb 10.4 oz)  GI/Nutrition:  Orders Placed This Encounter   Procedures   • Diet Order     Standing Status:   Standing     Number of Occurrences:   1     Order Specific Question:   Diet:     Answer:   Cardiac [6]     Lab Results:  Recent Labs      18   1230  18   1713  18   0520   WBC  7.7  6.9  7.0   RBC  5.35  5.27  4.81   HEMOGLOBIN  17.4*  17.1*  15.6   HEMATOCRIT  52.0*  50.9*  46.6   MCV  97.2  96.6  96.9   MCH  32.5  32.4  32.4   MCHC  33.5*  33.6  33.5*   RDW  44.2  43.8  43.7   PLATELETCT  216  200  154*   MPV  10.5  10.0  10.2     Recent Labs      18   1230  18   1713  18   0520   SODIUM  138  139  137   POTASSIUM  3.9  3.8  3.7   CHLORIDE  103  107   107   CO2  26  23  26   GLUCOSE  117*  118*  100*   BUN  10  9  8   CREATININE  0.63  0.66  0.60   CALCIUM  9.6  9.5  8.6     Recent Labs      04/18/18   1713  04/18/18   2345  04/19/18   0815   APTT   --   51.8*  86.7*   INR  1.04   --    --      Recent Labs      04/18/18   1230   BNPBTYPENAT  24     Recent Labs      04/18/18   1230  04/18/18   1713   TROPONINI  0.20*  0.34*   BNPBTYPENAT  24   --              Medical Decision Making, by Problem:  Active Hospital Problems    Diagnosis   • Acute coronary syndrome (CMS-HCC) [I24.9]   • CAD S/P percutaneous coronary angioplasty [I25.10, Z98.61]   • Tobacco dependency [F17.200]   • Peripheral neuropathy (CMS-HCC) [G62.9]       Plan:  67-year-old female with unstable angina s/p cath, poor surgical candidate.  I have asked CT surgery to see her for bypass surgery but anticipate they will decline.      1. Unstable angina    - asa    - heparin drip    - atorva 80    - metop 25 BID     2. CAD s/p stent to RCA    - per above     3. Tob Use    - encourage cessation     4. Polycythemia    - likely from COPD     Quality-Core Measures   Reviewed items::  EKG reviewed, Radiology images reviewed, Labs reviewed and Medications reviewed  Austin catheter::  No Austin  DVT prophylaxis pharmacological::  Heparin  DVT prophylaxis - mechanical:  SCDs  Ulcer Prophylaxis::  No

## 2018-04-19 NOTE — ASSESSMENT & PLAN NOTE
Significant h/o smoking; > 50 pack years    Plan  - Smoking cessation counseling provided for patient  - Nicotine patches for patient while inpatient

## 2018-04-19 NOTE — DIETARY
NUTRITION SERVICES: BMI - Pt with BMI >40 (= 40.17). Weight loss counseling not appropriate in acute care setting.     RECOMMEND - Referral to outpatient nutrition services for weight management after D/C.

## 2018-04-19 NOTE — ED NOTES
Pt now chest pain free. Nitro infusing 60mcg/min, per admitting MD hold nitro for BP=/<90/60.  Another RN started 2 piv access on pt.

## 2018-04-19 NOTE — PROGRESS NOTES
Transfer note    67 year old female with PMHx of CAD (remote s/p 4 stents 10 yrs back), remote DVT (not on current anticoagulation), peripheral neuropathy was brought in from urgent care for evaluation of persistent chest pain. In the ED was given  mg and EKG showed old Q-wave, regular rate and rhythm, no ST segment depressions or elevations. Troponin 0.34>0.20. Cardiology was consulted.As per Cardiology recommendation started on nitro drip and heparin drip.Subsequently taken to Cath lab. Cath showed severe three vessel disease but PCI was not done because of complex anatomy and recommendations- to admit to CCU with maximal medical therapy - iv nitro and iv heparin and possible partial revascularization with coronary intervention after discussing with patient and her family.

## 2018-04-19 NOTE — CONSULTS
DATE OF SERVICE:  04/19/2018    REFERRING PHYSICIAN:  Leopoldo Moreland MD    CONSULTING PHYSICIAN:  Mark Moreno MD    REASON FOR CONSULTATION:  Severe multivessel coronary artery disease, NSTEMI.    CHIEF COMPLAINT:  Chest pain.    HISTORY OF PRESENT ILLNESS:  This is a 67-year-old woman with a past medical   history of coronary artery disease status post stenting to the right coronary   artery and left anterior descending artery in [place], DVT, neuropathy, and   peripheral vascular disease who presented with symptoms of unstable angina and   was found to have mild troponinemia and severe multivessel coronary artery   disease.  Patient stated she had been having stuttering chest pain described   as pressure-like sensation over her anterior chest.  Patient lives a sedentary   lifestyle, is fairly inactive and can only walk a few steps before getting   crampy and painful bilateral foot and calf pain.  Patient finally presented to   the emergency room because the chest pain was constant and it would not go   away.  She was seen in the emergency room in [place] and transferred to   [facility].  She was given sublingual nitroglycerin and aspirin and her chest pain   improved.  EKG demonstrated inferior infarct with Q-waves.  There were no ST   segment changes.  She did undergo a CTA to rule out a PE, which was negative.    Her initial troponin in the emergency room was 0.2, which is now risen to   0.34.  She subsequently underwent a coronary angiogram that revealed a left   anterior descending artery that has 100% chronic total occlusion after a mid   vessel stent with in-stent restenosis.  There was some mild left to right collateral   circulation with extensive severe calcific obstructive disease throughout the   entire vessel extending into the apex.  The right coronary artery has a mid   vessel 100% without chronic total occlusion followed by a long distal stented   segment.  There was some left to right  collateral filling to the distal right   coronary artery.  Circumflex is without significant disease.  The superior   limb of the first obtuse marginal branch has a 95% ostial stenosis.  The   inferior branch has a 90% proximal stenosis.  The distal circumflex 90%   stenosis followed by a series of tiny caliber posterolateral branches.  The   flow along the obtuse marginal territory appears to be PEGGY 2. The left ventricular ejection fraction is 60% with inferior wall akinesis.  The patient was subsequently referred to me and consideration for   coronary artery bypass grafting.  Of note, the patient was not taking any   medications as an outpatient prior to this admission not because she was   noncompliant, but she said she was told to stop the medications.  She is also   still smoking about 3 quarters of a pack per day.  She is accompanied by her   sister who lives with her.    PAST MEDICAL HISTORY:  Coronary artery disease status post PCI to right   coronary artery and LAD, DVT, lower extremity neuropathy, bladder   incontinence, and peripheral artery disease.    PAST SURGICAL HISTORY:  Cholecystectomy, tonsillectomy, gastric bypass.    CURRENT OUTPATIENT MEDICATIONS:  Gabapentin 400 mg p.o. daily, ibuprofen 200   mg p.o. p.r.n., and omeprazole 40 mg p.o. daily.    ALLERGIES:  No known drug allergies.    FAMILY HISTORY:  Her father  of heart disease at age 58 and had CABG.  Her   mother  of cancer.    SOCIAL HISTORY:  The patient is single and lives in [place] with her sister.    She has smoked for approximately 50 years approximately 1 pack per day, has   now cut down to 3 quarters of a pack per day.  She does not drink alcohol and   denies any illicit drug use.  She walks with a cane.    REVIEW OF SYSTEMS:  A complete 14-point review of systems was performed and   was negative except for the following, chest pain (none currently), shortness   of breath (none currently), bilateral leg pain when walking,  bladder   incontinence.  Denies any history of stroke and she reports that she has   borderline diabetes.    PHYSICAL EXAMINATION:  VITAL SIGNS:  Height 169 cm, weight 115 kilograms, temperature 36.6 degrees   Celsius, pulse 68, satting 95% on nasal cannula, blood pressure 139/79.  GENERAL:  She is an obese, adult  woman in no apparent distress,   accompanied by her sister.  HEENT:  Normocephalic, atraumatic.  Sclerae are anicteric.  Oral and nasal   mucosa are moist.  Dentition is fair.  NECK:  There is no jugular venous distention.  There are no carotid bruits.    There is no cervical lymphadenopathy.  RESPIRATORY:  Moves air well bilaterally without the use of accessory   respiratory muscles.  Nasal cannula is in place.  CARDIOVASCULAR:  Normal rate, regular rhythm.  Distant heart sounds.  ABDOMEN:  Obese, soft, nontender, nondistended.  No palpable masses.  EXTREMITIES:  Warm.  There is no cyanosis, clubbing or edema.  Unable to   palpate DP or PT pulses.  NEUROLOGIC:  She is alert and oriented x3.  There are no gross neurologic   deficits.  PSYCHIATRIC:  Mood and affect normal.  MUSCULOSKELETAL:  Walks with a cane.  SKIN:  Normal without rashes.    ASSESSMENT:  Severe multivessel coronary artery disease, in-stent restenosis,   stable angina, polycythemia, tobacco abuse, obesity, peripheral vascular   disease, neuropathy.    PLAN:  This is a 67-year-old woman with a history of coronary artery disease   status post PCI to the right coronary artery and left anterior descending   arteries, who presented with symptoms of unstable angina, found to have severe   multivessel coronary artery disease with in-stent restenosis and chronic   total occlusion of the right coronary artery and LAD.  On my review of the   coronary angiogram, there is chronic total occlusion of the left anterior   descending artery with poor left to right collateral flow and severe   calcification distal to the occlusion.  The right  coronary artery does have   weak left to right collateral flow in the distal right coronary artery.  There   is inferior wall akinesis.  There appears to be PEGGY 2 flow into the first   and second obtuse marginal branches.  I discussed with the patient the risks   of coronary artery bypass grafting including high risk of pneumonia, prolonged ventilation or   tracheostomy.  Given her history of mobility issues, severe tobacco abuse and likely underlying   lung dysfunction, she would likely require a prolonged rehabilitation at a   skilled nursing facility for quite some time.  The patient is not interested   in having any outcome similar to this.  She prefers to be started on optimal   medical therapy and would also consider further peripheral coronary   intervention; however, is not interested in open heart surgery.  Her STS risk   of mortality is 2.9% and risk of morbidity and mortality is 22.8%; however,   her true mortality risk is in the 5% range.  She is a Child-Higgins class A and   New York Heart Association class III.  Further peripheral coronary   intervention to the obtuse marginal artery, which only has PEGGY 2 flow may   improve further collateral flow to the anterior and lateral walls and help   with her anginal symptoms.  Otherwise, I would recommend optimal medical   therapy.  This woman with many underlying comorbidities, poor functional   status and lack of adequate bypassable targets.    Thank you very much for allowing me to participate in the care of this   patient.       ____________________________________     MD HONG Martinez / WILEY    DD:  04/19/2018 10:45:25  DT:  04/19/2018 12:39:34    D#:  [id number]  Job#:  [id number]

## 2018-04-19 NOTE — PROGRESS NOTES
Dr. Stephenson updated regarding pt's continued neuropathy pain in [place]. Per MD, ok to order a second dose of gabapentin 400 mg. T/O RBV.

## 2018-04-19 NOTE — PROGRESS NOTES
2 RN assessment completed with Latha RN Skin intact, no wounds, generalized redness/dryness. Skin cracked and dry on heels.

## 2018-04-19 NOTE — PROGRESS NOTES
Spoke with Dr. Valdez about patient complaining of neuropathy. Ordered 1 time dose of gabapentin now, then reassess home regimen in AM.

## 2018-04-19 NOTE — ED NOTES
Pt to Cath lab. Family with pt, personal belongings sent with pt. Cath lab RN transporting pt with  tech.

## 2018-04-19 NOTE — CATH LAB
Immediate Post-Operative Note      PreOp Diagnosis:   1. Intractable angina pectoris.  2. CAD previous coronary stent procedure 10 years ago.    PostOp Diagnosis:  1. CAD. Severe three-vessel. Not amenable to immediate PCI and anatomy unfavorable for complete revascularization either from a surgical or catheter-based intervention.  2. Preserved EF with inferior akinesis.    Procedure(s) :  Coronary Angiography, Left Heart Catheterization, Left Ventriculography    Surgeon(s):  Yovanny Munoz M.D.    Type of Anesthesia: Moderate Sedation    Specimen: None    Complications: None.    Plan:  1. Admit CCU.  2. Continue IV heparin and nitroglycerin as tolerated.  3. Review options.    Yovanny Munoz M.D.  4/18/2018 7:08 PM

## 2018-04-19 NOTE — PROGRESS NOTES
[facility] ICU Transfer Note    Admit Date:    4/18/2018       Transfer Date:    4/19/2018   Primary diagnosis:   Acute coronary syndrome    ICU Course Summary (Brief Narrative):    67-year-old female with a past medical history of CAD, stents at RCA, Hx DVT, and active tobacco user, and medication noncompliance  admitted 4/18/2018 for unstable angina. initial EKG significant for old Q-wave, without ST segment depressions or elevations. Troponin was elevated at 0.20. Cardiology were consulted, patient started nitro and heparin drips. Lower extremity doppler and CT PE was negative for Pulmonary embolism & DVT. Cath 4/18/2018 showed mid vessel in-stent restenosis of left anterior descending artery,  with chronic total occlusionof the distal right coronary artery, and , severe stenoses of the circumflex artery, right coronary artery, mid vessel, and chronic total occlusion with severe in-stent restenosis with left-to-right collateral circulation. Left ventricular ejection fraction 60% post-PVC with akinesis of the proximal inferior wall.  The patient has been hemodynamically stable, and chest pain free, her nitro drip was discontinued, home gabapentin restarted, and will be transferred to [facility]. Cardiology following, questionable candidacy for CABG, cardiothoracic surgery will evaluate for CABG.    Labs and imaging studies to be continued daily after transfer and Indication:  CBC, APTT as patient is heparin drip, part of protocol   Continue BMP & Mg, as was slightly low previously    Things to follow:   Cardiology recommendations   Cardiothoracic surgery regarding candidacy for CABG  Counseling to stop smoking

## 2018-04-19 NOTE — PROGRESS NOTES
Patient arrived to unit from cath lab at 1900. Report received from Aliya NUNEZ (cath lab RN). On 1200 units/hr of heparin gtt. Nitro gtt stopped prior to arrival to unit due to hypotension. Patient AOx4, no chest pain. Vitals stable. Family updated on plan of care, questions/concerns addressed.

## 2018-04-19 NOTE — ED PROVIDER NOTES
ED Provider Note    CHIEF COMPLAINT  Chief Complaint   Patient presents with   • Chest Pain   • Neck Pain   • Headache   • Leg Pain   • Sent from Urgent Care       HPI  Raine Harrison is a 67 y.o. female who presents to emergency room today with complaints of chest pain, neck pain and leg pain. Patient's symptoms been on and off for several weeks getting worse today with pain described as tightness on her chest wall radiating up to her neck and down her left arm. She also said headaches on and off and bilateral leg pain. She has history of coronary artery disease with stent placements in the past and neuropathy. No fever chills or changes to bowel or bladder. Patient already received aspirin prior to arriving to the emergency room today. Patient appeared to be in severe distress as she has had elevated troponin and she has multiple risk factors for cardiac disease including tobacco use, family history, previous history of coronary disease. No history of IVDA and no risk factors for collagen vascular disease.    REVIEW OF SYSTEMS  See HPI for further details. All other systems are negative.     PAST MEDICAL HISTORY  Past Medical History:   Diagnosis Date   • Atherosclerotic heart disease of native coronary artery without angina pectoris     Coronary artery disease   • DVT (deep venous thrombosis) (CMS-HCC)    • Neuropathy (CMS-HCC)        FAMILY HISTORY  [unfilled]    SOCIAL HISTORY  Social History     Social History   • Marital status: Single     Spouse name: N/A   • Number of children: N/A   • Years of education: N/A     Social History Main Topics   • Smoking status: Current Every Day Smoker     Packs/day: 1.00     Years: 50.00     Types: Cigarettes   • Smokeless tobacco: Never Used   • Alcohol use Yes      Comment: rare   • Drug use: No   • Sexual activity: Not on file     Other Topics Concern   • Not on file     Social History Narrative   • No narrative on file       SURGICAL HISTORY  Past Surgical History:  "  Procedure Laterality Date   • ANGIOPLASTY         CURRENT MEDICATIONS  Home Medications     Reviewed by Deya Muro (Pharmacy Tech) on 04/18/18 at 1538  Med List Status: Complete   Medication Last Dose Status   gabapentin (NEURONTIN) 400 MG Cap 4/17/2018 Active   ibuprofen (MOTRIN) 200 MG Tab 4/18/2018 Active   omeprazole (PRILOSEC) 40 MG delayed-release capsule 4/18/2018 Active                ALLERGIES  No Known Allergies    PHYSICAL EXAM  VITAL SIGNS: /68   Pulse 93   Temp 37.1 °C (98.8 °F)   Resp 20   Ht 1.689 m (5' 6.5\")   Wt 111.1 kg (245 lb)   SpO2 97%   BMI 38.95 kg/m²  Room air O2: 95    Constitutional: Well developed, Well nourished, severe acute distress, Non-toxic appearance.   HENT: Normocephalic, Atraumatic, Bilateral external ears normal, Oropharynx moist, No oral exudates, Nose normal.   Eyes: PERRLA, EOMI, Conjunctiva normal, No discharge.   Neck: Normal range of motion, No tenderness, Supple, No stridor.   Lymphatic: No lymphadenopathy noted.   Cardiovascular: Normal heart rate, Normal rhythm, No murmurs, No rubs, No gallops.   Thorax & Lungs: Normal breath sounds, No respiratory distress, No wheezing, No chest tenderness.   Abdomen: Bowel sounds normal, Soft, No tenderness, No masses, No pulsatile masses.   Skin: Warm, Dry, No erythema, No rash.   Back: No tenderness, No CVA tenderness. .   Extremities: Intact distal pulses, No edema, No tenderness, No cyanosis, No clubbing.   Musculoskeletal: Good range of motion in all major joints. No tenderness to palpation or major deformities noted.   Neurologic: Alert & oriented x 3, Normal motor function, Normal sensory function, No focal deficits noted.   Psychiatric: Affect normal, Judgment normal, Mood normal.     EKG  Normal sinus rhythm and 90 beats per minute, no ST elevation but there is some diffuse depression ST segments. No widening of the QRS complex, poor R-wave progression, no diagnostic Q waves, this a 12-lead EKG " there is no previous EKG available with the staff comparison, IN intervals are normal.    RADIOLOGY/PROCEDURES  LE VENOUS DUPLEX   Final Result      CT-HEAD W/O   Final Result      1.  Diffuse atrophy and white matter changes.   2.  No acute intracranial hemorrhage or territorial infarct.         CT-CTA CHEST PULMONARY ARTERY W/ RECONS   Final Result      No proximal pulmonary emboli identified. The peripheral branches are not well opacified and cannot accurately be evaluated.   Dependent atelectasis/possible edema with the lungs. No pleural effusions.   Atherosclerotic changes including coronary artery calcifications.   Hepatic steatosis.            DX-CHEST-PORTABLE (1 VIEW)   Final Result      Hypoinflation without other evidence for acute cardiopulmonary disease.      DX-CHEST-2 VIEWS    (Results Pending)         COURSE & MEDICAL DECISION MAKING  Pertinent Labs & Imaging studies reviewed. (See chart for details)  CT scan was performed that shows no evidence of PE. Patient has elevated troponin at 0.2 and this is concerned has multiple risk factors for chronic disease including family history, tobacco use, previous cardiac disease. She is in severe distress secondary to suspected cardiac disease/elevated troponin and we admitted to the hospital had already received aspirin. Discussed case with hospitalist as well as with cardiology.    FINAL IMPRESSION  1. Acute chest pain  2. Elevated troponin  3. Coronary artery disease by history  4. Critical care time of 40 minutes; this includes multiple discussions with patient's family, discussions with hospitalist multiple occasions, reviewing records discussion treatment plan. This does not include any procedures.         Electronically signed by: Glenn Dsouza, 4/18/2018 6:51 PM

## 2018-04-19 NOTE — PROGRESS NOTES
Pulmonary Critical Care Progress Note        Date of Admission:  4/19/2018    Chief Complaint: Chest pain    History of Present Illness: 67-year-old lady   with a history of coronary artery disease and prior percutaneous coronary   intervention.  She presented to the emergency room today with chest pain.  She   describes the pain as a heaviness and pressure in the center of her chest,   which radiates to the left lateral chest as well as her left shoulder, left   arm and the left side of her neck.  There was no dyspnea, nausea, vomiting or   diaphoresis associated with the pain.  She presented to the emergency room,   she received sublingual nitroglycerin as well as intravenous nitroglycerin and   ultimately had relief of her chest pain.  She was then emergently taken to   the cardiac catheterization laboratory where she was found to have severe   3-vessel coronary artery disease including her left anterior descending   artery, mid vessel in-stent restenosis with chronic total occlusion of the   distal right coronary artery with severe extensive distal disease, severe   stenosis of her circumflex artery and right coronary artery.  Her left   ventricular ejection fraction was 60%.  No percutaneous coronary intervention   was performed due to her difficult anatomy.  She is now in the cardiac   intensive care unit and I have been asked to assist in her care and   management.    ROS:  Respiratory: negative, Cardiac: negative, GI: negative.  All other systems negative.    Interval Events:  24 hour interval history reviewed    - a/o   - afebrile   - meir PO   - no CP, hemodynamically stable   - declines intervention   - 2 lpm    - heparin gtt   - asa, statin, bb   - ntg off -    - OK to transfer to [facility]   - heparin gtt      PFSH:  No change.    Respiratory:     Pulse Oximetry: 93 %  Chest Tube Drains:          Exam: unlabored respirations, no intercostal retractions or accessory muscle use and diminished breath sounds  mild  ImagingAvailable data reviewed         Invalid input(s): [id number]    HemoDynamics:  Pulse: 68, Heart Rate (Monitored): 69  Blood Pressure : 138/68, NIBP: 147/70       Exam: regular rate and rhythm  Imaging: Available data reviewed  Recent Labs      04/18/18   1230  04/18/18 1713   TROPONINI  0.20*  0.34*   BNPBTYPENAT  24   --        Neuro:  GCS         Exam: no focal deficits noted  Imaging: Available data reviewed    Fluids:  Intake/Output       04/17/18 0700 - 04/18/18 0659 (Not Admitted) 04/18/18 0700 - 04/19/18 0659 04/19/18 0700 - 04/20/18 0659      [id number] 8456-7640 Total 9272-8475 9342-0484 Total 9497-1340 2954-0675 Total       Intake    P.O.  --  -- --  --  220 220  --  -- --    P.O. -- -- -- -- 220 220 -- -- --    I.V.  --  -- --  --  1569.3 1569.3  --  -- --    Nitroglycerin Volume -- -- -- -- 29.1 29.1 -- -- --    Heparin Volume -- -- -- -- 865.1 865.1 -- -- --    IV Volume (Normal saline) -- -- -- -- 675 675 -- -- --    Total Intake -- -- -- -- 1789.3 1789.3 -- -- --       Output    Urine  --  -- --  --  1000 1000  --  -- --    Number of Times Voided -- -- -- -- 2 x 2 x -- -- --    Void (ml) -- -- -- -- 1000 1000 -- -- --    Total Output -- -- -- -- 1000 1000 -- -- --       Net I/O     -- -- -- -- 789.3 789.3 -- -- --        Weight: 114.6 kg (252 lb 10.4 oz)  Recent Labs      04/18/18   1230  04/18/18   1713  04/19/18   0520   SODIUM  138  139  137   POTASSIUM  3.9  3.8  3.7   CHLORIDE  103  107  107   CO2  26  23  26   BUN  10  9  8   CREATININE  0.63  0.66  0.60   MAGNESIUM  2.1   --   1.8   CALCIUM  9.6  9.5  8.6       GI/Nutrition:  Exam: abdomen is soft and non-tender  Imaging: Available data reviewed  taking PO  Liver Function  Recent Labs      04/18/18   1230  04/18/18   1713  04/19/18   0520   ALTSGPT  14   --    --    ASTSGOT  22   --    --    ALKPHOSPHAT  67   --    --    TBILIRUBIN  0.5   --    --    GLUCOSE  117*  118*  100*       Heme:  Recent Labs      04/18/18   1230   18   1713  18   2345  18   0520  18   0815   RBC  5.35  5.27   --   4.81   --    HEMOGLOBIN  17.4*  17.1*   --   15.6   --    HEMATOCRIT  52.0*  50.9*   --   46.6   --    PLATELETCT  216  200   --   154*   --    PROTHROMBTM   --   13.3   --    --    --    APTT   --    --   51.8*   --   86.7*   INR   --   1.04   --    --    --        Infectious Disease:  Temp  Av.7 °C (98 °F)  Min: 36.4 °C (97.5 °F)  Max: 37.1 °C (98.8 °F)  Micro: reviewed  Recent Labs      18   1230  18   1713  18   0520   WBC  7.7  6.9  7.0   NEUTSPOLYS  55.50   --   56.90   LYMPHOCYTES  36.80   --   34.20   MONOCYTES  4.60   --   6.20   EOSINOPHILS  1.70   --   2.00   BASOPHILS  0.70   --   0.30   ASTSGOT  22   --    --    ALTSGPT  14   --    --    ALKPHOSPHAT  67   --    --    TBILIRUBIN  0.5   --    --      Current Facility-Administered Medications   Medication Dose Frequency Provider Last Rate Last Dose   • gabapentin (NEURONTIN) capsule 800 mg  800 mg TID Shobha Hernandez M.D.   800 mg at 18 0844   • metoprolol (LOPRESSOR) tablet 25 mg  25 mg TWICE DAILY Leopoldo Moreland M.D.   25 mg at 18 0844   • senna-docusate (PERICOLACE or SENOKOT S) 8.6-50 MG per tablet 2 Tab  2 Tab BID Tierra Barahona M.D.        And   • polyethylene glycol/lytes (MIRALAX) PACKET 1 Packet  1 Packet QDAY PRN Tierra Barahona M.D.        And   • magnesium hydroxide (MILK OF MAGNESIA) suspension 30 mL  30 mL QDAY PRN Tierra Barahona M.D.        And   • bisacodyl (DULCOLAX) suppository 10 mg  10 mg QDAY PRN Tierra Barahona M.D.       • Respiratory Care per Protocol   Continuous RT Tierra Barahona M.D.       • labetalol (NORMODYNE,TRANDATE) injection 10 mg  10 mg Q4HRS PRN Tierra Barahona M.D.       • ondansetron (ZOFRAN ODT) dispertab 4 mg  4 mg Q4HRS PRN Tierra Barahona M.D.       • nicotine (NICODERM) 21 MG/24HR 21 mg  21 mg Daily-0600 Tierra Barahona M.D.        And   • nicotine polacrilex (NICORETTE) 2 MG piece 2 mg   2 mg Q HOUR PRN Tierra Barahona M.D.       • acetaminophen (TYLENOL) tablet 650 mg  650 mg Q6HRS PRN Tierra Barahona M.D.       • aspirin EC (ECOTRIN) tablet 81 mg  81 mg DAILY Tierra Barahona M.D.   81 mg at 04/19/18 0844   • nitroglycerin 50 mg in D5W 250 ml infusion  0-200 mcg/min Continuous Leopoldo Moreland M.D.   Stopped at 04/18/18 1831   • nitroglycerin (NITROSTAT) tablet 0.4 mg  0.4 mg PRN Leopoldo Moreland M.D.   0.4 mg at 04/18/18 1630   • atorvastatin (LIPITOR) tablet 80 mg  80 mg QHS Tierra Barahona M.D.   80 mg at 04/18/18 2048   • morphine (pf) 4 mg/ml injection 4 mg  4 mg Q2HRS PRN Tierra Barahona M.D.   2 mg at 04/18/18 1738   • heparin injection 3,200 Units  3,200 Units PRN Carlos Garg M.D.   3,200 Units at 04/19/18 0136    And   • heparin infusion 25,000 units in 500 ml 0.45% nacl   Continuous Carlos Garg M.D. 29 mL/hr at 04/19/18 0800 1,450 Units/hr at 04/19/18 0800   • NS infusion   Continuous Yovanny Munoz M.D. 75 mL/hr at 04/18/18 2048       Last reviewed on 4/18/2018  3:38 PM by Deya Muro    Quality  Measures:  Labs reviewed, Medications reviewed and Radiology images reviewed                      Problems:  Acute non-ST segment elevation myocardial infarction   -  Unstable angina   - Now chest pain-free, discontinue nitroglycerin   - Ongoing heparin drip   - ASA, atorvastatin, metoprolol  Severe 3-vessel coronary artery disease   - Prior RCA stent   - Not amenable to PCI   - CVS eval although patient averse to open heart surgery  Ongoing tobacco use   - Counseled regarding cessation  COPD?   - Titrated oxygen, bronchodilators  Polycythemia  Remote history of deep venous thrombosis.  Peripheral neuropathy.      Discussed patient condition and risk of morbidity and/or mortality with RN, RT and QA team.

## 2018-04-19 NOTE — CARE PLAN
Problem: Venous Thromboembolism (VTW)/Deep Vein Thrombosis (DVT) Prevention:  Goal: Patient will participate in Venous Thrombosis (VTE)/Deep Vein Thrombosis (DVT)Prevention Measures    Intervention: Encourage patient to perform ankle flex, foot rotation, and knee flex exercises in addition to other prophylatic measures every hour while awake  Pt ambulated the hallways with PT and frequently ambulates to bathroom.  Pt on Heparin drip.

## 2018-04-19 NOTE — ASSESSMENT & PLAN NOTE
H/o CAD with 4 stent; last one placed 10 years ago.  Cath 4/18/2018 showed mid vessel in-stent restenosis of left anterior descending artery,  with chronic total occlusionof the distal right coronary artery, and , severe stenoses of the circumflex artery, right coronary artery, mid vessel, and chronic total occlusion with severe in-stent restenosis with left-to-right collateral circulation.    Cardiothoracic surgery patient is not a good candidate for CABG, will like need prolonged intubation and rehab, accordingly the patient will go with medical therapy.      - Con aspirin 81  - Heparin Drip  - Metoprolol 25 mg QD   - Atorvastatin 80 mg    -Morphine and tylenol for pain

## 2018-04-19 NOTE — PROGRESS NOTES
Received report from ENRIQUE Matthew; care assumed. Pt resting in bed, watching tv-- denies needs at this time.

## 2018-04-20 ENCOUNTER — PATIENT OUTREACH (OUTPATIENT)
Dept: HEALTH INFORMATION MANAGEMENT | Facility: OTHER | Age: 68
End: 2018-04-20

## 2018-04-20 VITALS
TEMPERATURE: 97.3 F | HEART RATE: 73 BPM | HEIGHT: 67 IN | SYSTOLIC BLOOD PRESSURE: 138 MMHG | DIASTOLIC BLOOD PRESSURE: 68 MMHG | WEIGHT: 252.65 LBS | OXYGEN SATURATION: 93 % | BODY MASS INDEX: 39.65 KG/M2 | RESPIRATION RATE: 20 BRPM

## 2018-04-20 LAB
ANION GAP SERPL CALC-SCNC: 5 MMOL/L (ref 0–11.9)
APTT PPP: 45.4 SEC (ref 24.7–36)
BASOPHILS # BLD AUTO: 0.5 % (ref 0–1.8)
BASOPHILS # BLD: 0.03 K/UL (ref 0–0.12)
BUN SERPL-MCNC: 8 MG/DL (ref 8–22)
CALCIUM SERPL-MCNC: 9.2 MG/DL (ref 8.5–10.5)
CHLORIDE SERPL-SCNC: 107 MMOL/L (ref 96–112)
CO2 SERPL-SCNC: 26 MMOL/L (ref 20–33)
CREAT SERPL-MCNC: 0.62 MG/DL (ref 0.5–1.4)
EOSINOPHIL # BLD AUTO: 0.13 K/UL (ref 0–0.51)
EOSINOPHIL NFR BLD: 2 % (ref 0–6.9)
ERYTHROCYTE [DISTWIDTH] IN BLOOD BY AUTOMATED COUNT: 43.4 FL (ref 35.9–50)
GLUCOSE SERPL-MCNC: 103 MG/DL (ref 65–99)
HCT VFR BLD AUTO: 49.2 % (ref 37–47)
HGB BLD-MCNC: 16.4 G/DL (ref 12–16)
IMM GRANULOCYTES # BLD AUTO: 0.04 K/UL (ref 0–0.11)
IMM GRANULOCYTES NFR BLD AUTO: 0.6 % (ref 0–0.9)
LYMPHOCYTES # BLD AUTO: 2.55 K/UL (ref 1–4.8)
LYMPHOCYTES NFR BLD: 40.1 % (ref 22–41)
MAGNESIUM SERPL-MCNC: 1.9 MG/DL (ref 1.5–2.5)
MCH RBC QN AUTO: 32.4 PG (ref 27–33)
MCHC RBC AUTO-ENTMCNC: 33.3 G/DL (ref 33.6–35)
MCV RBC AUTO: 97.2 FL (ref 81.4–97.8)
MONOCYTES # BLD AUTO: 0.41 K/UL (ref 0–0.85)
MONOCYTES NFR BLD AUTO: 6.4 % (ref 0–13.4)
NEUTROPHILS # BLD AUTO: 3.2 K/UL (ref 2–7.15)
NEUTROPHILS NFR BLD: 50.4 % (ref 44–72)
NRBC # BLD AUTO: 0 K/UL
NRBC BLD-RTO: 0 /100 WBC
PLATELET # BLD AUTO: 176 K/UL (ref 164–446)
PMV BLD AUTO: 10.3 FL (ref 9–12.9)
POTASSIUM SERPL-SCNC: 3.9 MMOL/L (ref 3.6–5.5)
RBC # BLD AUTO: 5.06 M/UL (ref 4.2–5.4)
SODIUM SERPL-SCNC: 138 MMOL/L (ref 135–145)
WBC # BLD AUTO: 6.4 K/UL (ref 4.8–10.8)

## 2018-04-20 PROCEDURE — 700102 HCHG RX REV CODE 250 W/ 637 OVERRIDE(OP): Performed by: HOSPITALIST

## 2018-04-20 PROCEDURE — A9270 NON-COVERED ITEM OR SERVICE: HCPCS | Performed by: INTERNAL MEDICINE

## 2018-04-20 PROCEDURE — 83735 ASSAY OF MAGNESIUM: CPT

## 2018-04-20 PROCEDURE — 700102 HCHG RX REV CODE 250 W/ 637 OVERRIDE(OP): Performed by: INTERNAL MEDICINE

## 2018-04-20 PROCEDURE — 80048 BASIC METABOLIC PNL TOTAL CA: CPT

## 2018-04-20 PROCEDURE — A9270 NON-COVERED ITEM OR SERVICE: HCPCS | Performed by: STUDENT IN AN ORGANIZED HEALTH CARE EDUCATION/TRAINING PROGRAM

## 2018-04-20 PROCEDURE — 700111 HCHG RX REV CODE 636 W/ 250 OVERRIDE (IP): Performed by: INTERNAL MEDICINE

## 2018-04-20 PROCEDURE — A9270 NON-COVERED ITEM OR SERVICE: HCPCS | Performed by: HOSPITALIST

## 2018-04-20 PROCEDURE — 700111 HCHG RX REV CODE 636 W/ 250 OVERRIDE (IP): Performed by: HOSPITALIST

## 2018-04-20 PROCEDURE — 85730 THROMBOPLASTIN TIME PARTIAL: CPT

## 2018-04-20 PROCEDURE — 85025 COMPLETE CBC W/AUTO DIFF WBC: CPT

## 2018-04-20 PROCEDURE — 700102 HCHG RX REV CODE 250 W/ 637 OVERRIDE(OP): Performed by: STUDENT IN AN ORGANIZED HEALTH CARE EDUCATION/TRAINING PROGRAM

## 2018-04-20 RX ORDER — NITROGLYCERIN 0.4 MG/1
0.4 TABLET SUBLINGUAL PRN
Qty: 25 TAB | Refills: 0 | Status: SHIPPED | OUTPATIENT
Start: 2018-04-20 | End: 2018-04-27 | Stop reason: SDUPTHER

## 2018-04-20 RX ORDER — MAGNESIUM SULFATE HEPTAHYDRATE 40 MG/ML
2 INJECTION, SOLUTION INTRAVENOUS ONCE
Status: COMPLETED | OUTPATIENT
Start: 2018-04-20 | End: 2018-04-20

## 2018-04-20 RX ORDER — ASPIRIN 81 MG/1
81 TABLET ORAL DAILY
Qty: 30 TAB | Refills: 0 | Status: SHIPPED | OUTPATIENT
Start: 2018-04-21 | End: 2018-04-27 | Stop reason: SDUPTHER

## 2018-04-20 RX ORDER — ATORVASTATIN CALCIUM 80 MG/1
80 TABLET, FILM COATED ORAL
Qty: 30 TAB | Refills: 0 | Status: SHIPPED | OUTPATIENT
Start: 2018-04-20 | End: 2018-04-27 | Stop reason: SDUPTHER

## 2018-04-20 RX ORDER — POTASSIUM CHLORIDE 20 MEQ/1
20 TABLET, EXTENDED RELEASE ORAL ONCE
Status: COMPLETED | OUTPATIENT
Start: 2018-04-20 | End: 2018-04-20

## 2018-04-20 RX ADMIN — HEPARIN SODIUM 3200 UNITS: 1000 INJECTION, SOLUTION INTRAVENOUS; SUBCUTANEOUS at 06:19

## 2018-04-20 RX ADMIN — MAGNESIUM SULFATE HEPTAHYDRATE 2 G: 40 INJECTION, SOLUTION INTRAVENOUS at 10:47

## 2018-04-20 RX ADMIN — ASPIRIN 81 MG: 81 TABLET, COATED ORAL at 09:23

## 2018-04-20 RX ADMIN — METOPROLOL TARTRATE 25 MG: 25 TABLET, FILM COATED ORAL at 09:23

## 2018-04-20 RX ADMIN — HEPARIN SODIUM 1700 UNITS/HR: 5000 INJECTION, SOLUTION INTRAVENOUS; SUBCUTANEOUS at 06:50

## 2018-04-20 RX ADMIN — GABAPENTIN 800 MG: 400 CAPSULE ORAL at 09:23

## 2018-04-20 RX ADMIN — POTASSIUM CHLORIDE 20 MEQ: 1500 TABLET, EXTENDED RELEASE ORAL at 09:30

## 2018-04-20 ASSESSMENT — ENCOUNTER SYMPTOMS
CONSTITUTIONAL NEGATIVE: 1
ABDOMINAL PAIN: 0
SHORTNESS OF BREATH: 0
EYE PAIN: 0
LOSS OF CONSCIOUSNESS: 0
MUSCULOSKELETAL NEGATIVE: 1
COUGH: 0
HEMOPTYSIS: 0
SEIZURES: 0
ORTHOPNEA: 0
CLAUDICATION: 0
VOMITING: 0
FEVER: 0
CHILLS: 0
NEUROLOGICAL NEGATIVE: 1
WEAKNESS: 0
BRUISES/BLEEDS EASILY: 0
BLURRED VISION: 0
SORE THROAT: 0
PND: 0
RESPIRATORY NEGATIVE: 1
WHEEZING: 0
SPUTUM PRODUCTION: 0
CARDIOVASCULAR NEGATIVE: 1
GASTROINTESTINAL NEGATIVE: 1
PALPITATIONS: 0
EYES NEGATIVE: 1
FALLS: 0
DIZZINESS: 0
STRIDOR: 0

## 2018-04-20 ASSESSMENT — PAIN SCALES - GENERAL
PAINLEVEL_OUTOF10: 0

## 2018-04-20 ASSESSMENT — LIFESTYLE VARIABLES: EVER_SMOKED: YES

## 2018-04-20 NOTE — PROGRESS NOTES
[facility] ICU Progress Note      Admit Date: 4/18/2018  ICU Day: 2    Resident(s): Shobha Hernandez  Attending: ESTEFANIA MOSER/ PUNEET Sheridan     Date & Time:   4/20/2018   7:15 AM       Patient ID:    Name:             Raine Harrison   YOB: 1950  Age:                 67 y.o.  female   MRN:               [id number]    HPI:  67-year-old female with a past medical history of CAD, stents at RCA, Hx DVT, and active tobacco user, and medication noncompliance  admitted 4/18/2018 for unstable angina. initial EKG significant for old Q-wave, without ST segment depressions or elevations. Troponin was elevated at 0.20. Cardiology were consulted, patient started nitro and heparin drips. Lower extremity doppler and CT PE was negative for Pulmonary embolism & DVT. Cath 4/18/2018 showed mid vessel in-stent restenosis of left anterior descending artery,  with chronic total occlusionof the distal right coronary artery, and , severe stenoses of the circumflex artery, right coronary artery, mid vessel, and chronic total occlusion with severe in-stent restenosis with left-to-right collateral circulation. Left ventricular ejection fraction 60% post-PVC with akinesis of the proximal inferior wall.   Cardiothoracic surgery patient is not a good candidate for CABG, will like need prolonged intubation and rehab, accordingly the patient will go with medical therapy.     Consultants:  Cardiology   PMA:     Procedures:  Cath 4/18/2018     Interval Events:    4/20/2018   Hemodynamically stable. Chest pain free.   Cardiothoracic surgery patient is not a good candidate for CABG, will like need prolonged intubation and rehab, accordingly the patient will go with medical therapy. Cardiology ok with discharge to follow as outpatient, and encourage smoking cessation.     4/19/2018   Hemodynamically stable. Chest pain free. Restarted home gabapentin.  Cardiology following, appreciates questionable candidacy for CABG, Cardiothoracic  surgery will evaluate for CABG    Review of Systems   Constitutional: Negative for chills and fever.   HENT: Negative for sore throat.    Eyes: Negative for blurred vision and pain.   Respiratory: Negative for sputum production, shortness of breath and stridor.    Cardiovascular: Negative for chest pain and palpitations.   Gastrointestinal: Negative for abdominal pain and vomiting.   Genitourinary: Negative for hematuria and urgency.   Musculoskeletal: Negative for falls.   Skin: Negative for rash.   Neurological: Negative for seizures and loss of consciousness.       Physical Exam   Constitutional: She is oriented to person, place, and time. No distress.   HENT:   Head: Normocephalic and atraumatic.   Eyes: Pupils are equal, round, and reactive to light.   Neck: No JVD present.   Cardiovascular: Normal rate and regular rhythm.  Exam reveals no gallop and no friction rub.    No murmur heard.  Pulmonary/Chest: Effort normal. No stridor. No respiratory distress. She has no wheezes. She has no rales.   Abdominal: Soft. She exhibits no distension. There is no tenderness. There is no rebound.   Neurological: She is alert and oriented to person, place, and time. No cranial nerve deficit.   Skin: Skin is warm and dry. She is not diaphoretic.   Psychiatric: She has a normal mood and affect.               Invalid input(s): [id number]    HemoDynamics:  Pulse: 66, Heart Rate (Monitored): 70 NIBP: (!) 98/53          Intake/Output Summary (Last 24 hours) at 04/19/18 0631  Last data filed at 04/19/18 0600   Gross per 24 hour   Intake          1789.25 ml   Output             1000 ml   Net           789.25 ml          Body mass index is 40.17 kg/m².    Recent Labs      04/18/18   1230  04/18/18   1713  04/19/18   0520  04/20/18   0420   SODIUM  138  139  137  138   POTASSIUM  3.9  3.8  3.7  3.9   CHLORIDE  103  107  107  107   CO2  26  23  26  26   BUN  10  9  8  8   CREATININE  0.63  0.66  0.60  0.62   MAGNESIUM  2.1   --    1.8  1.9   CALCIUM  9.6  9.5  8.6  9.2       GI/Nutrition:  Recent Labs      18   1230  18   1713  04/19/18   0518   0420   ALTSGPT  14   --    --    --    ASTSGOT  22   --    --    --    ALKPHOSPHAT  67   --    --    --    TBILIRUBIN  0.5   --    --    --    GLUCOSE  117*  118*  100*  103*       Heme:  Recent Labs      18   1713   04/19/18   0520  18   0815  18   1413  18   0420   RBC  5.27   --   4.81   --    --   5.06   HEMOGLOBIN  17.1*   --   15.6   --    --   16.4*   HEMATOCRIT  50.9*   --   46.6   --    --   49.2*   PLATELETCT  200   --   154*   --    --   176   PROTHROMBTM  13.3   --    --    --    --    --    APTT   --    < >   --   86.7*  60.6*  45.4*   INR  1.04   --    --    --    --    --     < > = values in this interval not displayed.       Infectious Disease:  Temp  Av.5 °C (97.7 °F)  Min: 36.3 °C (97.3 °F)  Max: 36.7 °C (98.1 °F)  Recent Labs      18   1230  18   0518   0420   WBC  7.7  6.9  7.0  6.4   NEUTSPOLYS  55.50   --   56.90  50.40   LYMPHOCYTES  36.80   --   34.20  40.10   MONOCYTES  4.60   --   6.20  6.40   EOSINOPHILS  1.70   --   2.00  2.00   BASOPHILS  0.70   --   0.30  0.50   ASTSGOT  22   --    --    --    ALTSGPT  14   --    --    --    ALKPHOSPHAT  67   --    --    --    TBILIRUBIN  0.5   --    --    --        Meds:  • gabapentin  800 mg     • metoprolol  25 mg     • senna-docusate  2 Tab      And   • polyethylene glycol/lytes  1 Packet      And   • magnesium hydroxide  30 mL      And   • bisacodyl  10 mg     • Respiratory Care per Protocol       • labetalol  10 mg     • ondansetron  4 mg     • nicotine  21 mg      And   • nicotine polacrilex  2 mg     • acetaminophen  650 mg     • aspirin EC  81 mg     • nitroglycerin  0.4 mg     • atorvastatin  80 mg     • morphine injection  4 mg     • heparin  3,200 Units      And   • heparin   1,700 Units/hr (18 0650)        Acute coronary syndrome  (CMS-HCC)  67 year old presented with chest pain, elevated troponin. BNP 24. EKG was negative for acute ischemic ST changes. Given patient had h/o DVT in the past, lower extremity doppler and CTA was performed. Lower extremity doppler was negative for DVT and CTA was negative for Pulmonary embolism. Cardiology was consulted. Patient is having NSTEMI. Cath 4/18/2018 shows advanced sever disease.     Plan  - Con aspirin 81  - Heparin Drip  - Metoprolol 25 mg QD  - Atorvastatin 80 mg    - S/P nitroglycerin drip   - Cardiothoracic surgery patient is not a good candidate for CABG, will like need prolonged intubation and rehab, accordingly the patient will go with medical therapy.   - Morphine and tylenol for pain     CAD S/P percutaneous coronary angioplasty  H/o CAD with 4 stent; last one placed 10 years ago.  Cath 4/18/2018 showed mid vessel in-stent restenosis of left anterior descending artery,  with chronic total occlusionof the distal right coronary artery, and , severe stenoses of the circumflex artery, right coronary artery, mid vessel, and chronic total occlusion with severe in-stent restenosis with left-to-right collateral circulation.    Cardiothoracic surgery patient is not a good candidate for CABG, will like need prolonged intubation and rehab, accordingly the patient will go with medical therapy.      - Con aspirin 81  - Heparin Drip  - Metoprolol 25 mg QD   - Atorvastatin 80 mg    -Morphine and tylenol for pain       Tobacco dependency  Significant h/o smoking; > 50 pack years    Plan  - Smoking cessation counseling provided for patient  - Nicotine patches for patient while inpatient       Peripheral neuropathy (CMS-HCC)  H/o peripheral neuropathy, on gabapentin and ibuprofen as outpatient    Plan  - Restart home gabapentin 4/19/2018 at 800 mg TID   - Morphine and/or tylenol for pain        Quality Measures:  DVT Prophylaxis:  On Heparin drip    Antibiotics:  N/A  Lines:   PIV  DISPO:   Inpatient  CODE  STATUS:  FULL

## 2018-04-20 NOTE — DISCHARGE SUMMARY
Internal Medicine Discharge Summary  Note Author: Shobha Hernandez M.D.     Admit Date:  4/18/2018       Discharge Date:   4/20/2018      Service:   [facility] Internal Medicine GOLD Team  Attending Physician(s):   Jerman Mejia M.D.   Senior Resident(s):   Mary   Feliciano Resident(s):   Akhil     Primary Diagnosis:   Acute coronary syndrome     Secondary Diagnoses:                Acute coronary syndrome   Coronary artery disease   Tobacco dependency   Peripheral neuropathy     Hospital Summary (Brief Narrative):       67-year-old female with a past medical history of CAD, stents at RCA, Hx DVT, and active tobacco user, and medication noncompliance  admitted 4/18/2018 for unstable angina. initial EKG significant for old Q-wave, without ST segment depressions or elevations. Troponin was elevated at 0.20. The patient was started on heparin drip, metoprolol atorvastatin and aspirin. Cardiology were consulted, patient started nitro and heparin drips. Lower extremity doppler and CT PE was negative for pulmonary embolism & DVT. Cath 4/18/2018 showed mid vessel in-stent restenosis of left anterior descending artery,  with chronic total occlusionof the distal right coronary artery, and , severe stenoses of the circumflex artery, right coronary artery, mid vessel, and chronic total occlusion with severe in-stent restenosis with left-to-right collateral circulation. Left ventricular ejection fraction 60% post-PVC with akinesis of the proximal inferior wall. Cardiothoracic surgery evaluated the patient and concluded that the patient is not a good candidate for CABG, as she will likely need prolonged intubation and rehab, accordingly the patient will continue with medical therapy. The patient was discharged on  aspirin 81 mg, Metoprolol 25 mg QD BID, and Atorvastatin 80 mg. In addition to PRN subligual nitro.  To follow with cardiology and primary car provider. Patient is still an active tobacco user, she was offered  nicotine while in the hospital and was encouraged to quite. Recommended to follow with primary care provider for further help.     Consultants:     Cardiology: Leopoldo Moreland  Cardiothoracic surgery: Mark Moreno    Procedures:        Coronary Angiography, Left Heart Catheterization, Left Ventriculography 4/18/2018     Imaging/ Testing:      Coronary Angiography, Left Heart Catheterization, Left Ventriculography 4/18/2018     CT-HEAD W/O  Diffuse atrophy and white matter changes.  No acute intracranial hemorrhage or territorial infarct.    CT-CTA CHEST PULMONARY ARTERY W/ RECONS  No proximal pulmonary emboli identified.   Dependent atelectasis/possible edema with the lungs. No pleural effusions.  Atherosclerotic changes including coronary artery calcifications.  Hepatic steatosis.    DX-CHEST-PORTABLE (1 VIEW)  Hypoinflation without other evidence for acute cardiopulmonary disease.    Discharge Medications:         Medication Reconciliation: Completed       Medication List      START taking these medications      Instructions   aspirin 81 MG EC tablet  Start taking on:  4/21/2018   Take 1 Tab by mouth every day.  Dose:  81 mg     atorvastatin 80 MG tablet  Commonly known as:  LIPITOR   Take 1 Tab by mouth every bedtime.  Dose:  80 mg     metoprolol 25 MG Tabs  Commonly known as:  LOPRESSOR   Take 1 Tab by mouth 2 Times a Day.  Dose:  25 mg     nitroglycerin 0.4 MG Subl  Commonly known as:  NITROSTAT   Place 1 Tab under tongue as needed for Chest Pain.  Dose:  0.4 mg        CONTINUE taking these medications      Instructions   gabapentin 400 MG Caps  Commonly known as:  NEURONTIN   Take 800 mg by mouth 4 times a day.  Dose:  800 mg     omeprazole 40 MG delayed-release capsule  Commonly known as:  PRILOSEC   Take 40 mg by mouth every day.  Dose:  40 mg        STOP taking these medications    ibuprofen 200 MG Tabs  Commonly known as:  MOTRIN          Disposition:   Home     Diet:   Cardiac     Activity:   As  tolerated     Instructions:       The patient was instructed to return to the ER in the event of worsening symptoms. I have counseled the patient on the importance of compliance and the patient has agreed to proceed with all medical recommendations and follow up plan indicated above.   The patient understands that all medications come with benefits and risks. Risks may include permanent injury or death and these risks can be minimized with close reassessment and monitoring.        Primary Care Provider:    Hamzah Barron M.D.   Discharge summary faxed to primary care provider Hamzah Barron M.D. :  Completed     Time spent on discharge day patient visit, preparing discharge paperwork and arranging for patient follow up.  Discharge Time (Minutes) :    40    Most Recent Labs:    Lab Results   Component Value Date/Time    WBC 6.4 04/20/2018 04:20 AM    RBC 5.06 04/20/2018 04:20 AM    HEMOGLOBIN 16.4 (H) 04/20/2018 04:20 AM    HEMATOCRIT 49.2 (H) 04/20/2018 04:20 AM    MCV 97.2 04/20/2018 04:20 AM    MCH 32.4 04/20/2018 04:20 AM    MCHC 33.3 (L) 04/20/2018 04:20 AM    MPV 10.3 04/20/2018 04:20 AM    NEUTSPOLYS 50.40 04/20/2018 04:20 AM    LYMPHOCYTES 40.10 04/20/2018 04:20 AM    MONOCYTES 6.40 04/20/2018 04:20 AM    EOSINOPHILS 2.00 04/20/2018 04:20 AM    BASOPHILS 0.50 04/20/2018 04:20 AM      Lab Results   Component Value Date/Time    SODIUM 138 04/20/2018 04:20 AM    POTASSIUM 3.9 04/20/2018 04:20 AM    CHLORIDE 107 04/20/2018 04:20 AM    CO2 26 04/20/2018 04:20 AM    GLUCOSE 103 (H) 04/20/2018 04:20 AM    BUN 8 04/20/2018 04:20 AM    CREATININE 0.62 04/20/2018 04:20 AM      Lab Results   Component Value Date/Time    ALTSGPT 14 04/18/2018 12:30 PM    ASTSGOT 22 04/18/2018 12:30 PM    ALKPHOSPHAT 67 04/18/2018 12:30 PM    TBILIRUBIN 0.5 04/18/2018 12:30 PM    ALBUMIN 3.8 04/18/2018 12:30 PM    GLOBULIN 3.3 04/18/2018 12:30 PM    INR 1.04 04/18/2018 05:13 PM     Lab Results   Component Value Date/Time     PROTHROMBTM 13.3 04/18/2018 05:13 PM    INR 1.04 04/18/2018 05:13 PM

## 2018-04-20 NOTE — CARE PLAN
Problem: Safety  Goal: Will remain free from falls  Outcome: PROGRESSING AS EXPECTED  Pt educated regarding the use of call light when needing assistance. Pt demonstrated and verbalized the proper use of a call light and to call for assistance. Fall precautions in place, treaded slippers on, personal belongings/phone in reach. Pt has a steady gate ambulating SBA and is encouraged to call if assistance is needed. Bed alarm is set when pt asleep.       Problem: Venous Thromboembolism (VTW)/Deep Vein Thrombosis (DVT) Prevention:  Goal: Patient will participate in Venous Thrombosis (VTE)/Deep Vein Thrombosis (DVT)Prevention Measures  Outcome: PROGRESSING AS EXPECTED  Pt therapeutic on heparin gtt.     Problem: Bowel/Gastric:  Goal: Will not experience complications related to bowel motility  Outcome: PROGRESSING AS EXPECTED  NO BM since prior to admission. RN encouraged to take sennekot tonight, but refused despite education.     Problem: Knowledge Deficit  Goal: Knowledge of the prescribed therapeutic regimen will improve  Outcome: PROGRESSING AS EXPECTED  Metoprolol education provided including side effects, indication and adverse reactions.     Problem: Pain Management  Goal: Pain level will decrease to patient's comfort goal  Outcome: PROGRESSING AS EXPECTED  NO c/o chest pain tonight. Had headache at the beginning of shift, but resolved after tylenol administration.

## 2018-04-20 NOTE — PROGRESS NOTES
Cardiology Progress Note               Author: Leopoldo Moreland Date & Time created: 4/20/2018  2:03 PM     Interval History:  Surgery turned down yesterday  No chest pain overnight  BP stable on meds      Chief Complaint:  ACS    Review of Systems   Constitutional: Negative.  Negative for chills, fever and malaise/fatigue.   HENT: Negative.  Negative for sore throat.    Eyes: Negative.    Respiratory: Negative.  Negative for cough, hemoptysis, sputum production, shortness of breath, wheezing and stridor.    Cardiovascular: Negative.  Negative for chest pain, palpitations, orthopnea, claudication, leg swelling and PND.   Gastrointestinal: Negative.    Genitourinary: Negative.    Musculoskeletal: Negative.    Skin: Negative.    Neurological: Negative.  Negative for dizziness, loss of consciousness and weakness.   Endo/Heme/Allergies: Negative.  Does not bruise/bleed easily.   All other systems reviewed and are negative.      Physical Exam   Constitutional: She is oriented to person, place, and time. She appears well-developed and well-nourished. No distress.   HENT:   Head: Normocephalic.   Mouth/Throat: Oropharynx is clear and moist.   Eyes: EOM are normal. Pupils are equal, round, and reactive to light. Right eye exhibits no discharge. Left eye exhibits no discharge. No scleral icterus.   Neck: Normal range of motion. Neck supple. No JVD present. No tracheal deviation present.   Cardiovascular: Normal rate, regular rhythm, S1 normal, S2 normal, normal heart sounds, intact distal pulses and normal pulses.  Exam reveals no gallop, no S3, no S4 and no friction rub.    No murmur heard.   No systolic murmur is present    No diastolic murmur is present   Pulses:       Carotid pulses are 2+ on the right side, and 2+ on the left side.       Radial pulses are 2+ on the right side, and 2+ on the left side.        Dorsalis pedis pulses are 2+ on the right side, and 2+ on the left side.        Posterior tibial pulses are 2+  on the right side, and 2+ on the left side.   Pulmonary/Chest: Effort normal and breath sounds normal. No respiratory distress. She has no wheezes. She has no rales.   Abdominal: Soft. Bowel sounds are normal. She exhibits no distension and no mass. There is no tenderness. There is no rebound and no guarding.   Musculoskeletal: She exhibits no edema.   Neurological: She is alert and oriented to person, place, and time. No cranial nerve deficit.   Skin: Skin is warm and dry. She is not diaphoretic. No pallor.   Psychiatric: She has a normal mood and affect. Her behavior is normal. Judgment and thought content normal.   Nursing note and vitals reviewed.      Hemodynamics:  Temp (24hrs), Av.4 °C (97.6 °F), Min:36.3 °C (97.3 °F), Max:36.7 °C (98.1 °F)  Temperature: 36.3 °C (97.3 °F)  Pulse  Av.9  Min: 57  Max: 99Heart Rate (Monitored): 70  NIBP: 103/57     Respiratory:    Respiration: 20, Pulse Oximetry: 93 %        RUL Breath Sounds: Clear, RML Breath Sounds: Diminished, RLL Breath Sounds: Diminished, PREETI Breath Sounds: Clear, LLL Breath Sounds: Diminished  Fluids:        GI/Nutrition:  Orders Placed This Encounter   Procedures   • Diet Order     Standing Status:   Standing     Number of Occurrences:   1     Order Specific Question:   Diet:     Answer:   Cardiac [6]     Lab Results:  Recent Labs      18   1713  04/19/18   0520  18   0420   WBC  6.9  7.0  6.4   RBC  5.27  4.81  5.06   HEMOGLOBIN  17.1*  15.6  16.4*   HEMATOCRIT  50.9*  46.6  49.2*   MCV  96.6  96.9  97.2   MCH  32.4  32.4  32.4   MCHC  33.6  33.5*  33.3*   RDW  43.8  43.7  43.4   PLATELETCT  200  154*  176   MPV  10.0  10.2  10.3     Recent Labs      18   1713  18   0520  18   0420   SODIUM  139  137  138   POTASSIUM  3.8  3.7  3.9   CHLORIDE  107  107  107   CO2  23  26  26   GLUCOSE  118*  100*  103*   BUN  9  8  8   CREATININE  0.66  0.60  0.62   CALCIUM  9.5  8.6  9.2     Recent Labs      18   5025    04/19/18   0815  04/19/18   1413  04/20/18   0420   APTT   --    < >  86.7*  60.6*  45.4*   INR  1.04   --    --    --    --     < > = values in this interval not displayed.     Recent Labs      04/18/18   1230   BNPBTYPENAT  24     Recent Labs      04/18/18   1230  04/18/18   1713   TROPONINI  0.20*  0.34*   BNPBTYPENAT  24   --              Medical Decision Making, by Problem:  Active Hospital Problems    Diagnosis   • Acute coronary syndrome (CMS-HCC) [I24.9]   • CAD S/P percutaneous coronary angioplasty [I25.10, Z98.61]   • Tobacco dependency [F17.200]   • Peripheral neuropathy (CMS-HCC) [G62.9]       Plan:  67-year-old female with unstable angina s/p cath, poor surgical candidate.  She is safe for discharge today.  We can see her cardiology clinic.     1. Unstable angina    - asa    - stop heparin drip    - atorva 80    - metop 25 BID     2. CAD s/p stent to RCA    - per above     3. Tob Use    - encourage cessation     4. Polycythemia    - likely from COPD     Quality-Core Measures   Reviewed items::  EKG reviewed, Radiology images reviewed, Labs reviewed and Medications reviewed  Austin catheter::  No Austin  DVT prophylaxis pharmacological::  Heparin  DVT prophylaxis - mechanical:  SCDs  Ulcer Prophylaxis::  No

## 2018-04-20 NOTE — DISCHARGE INSTRUCTIONS
Discharge Instructions    Discharged to home by car with relative. Discharged via wheelchair, hospital escort: Yes.  Special equipment needed: Not Applicable    Be sure to schedule a follow-up appointment with your primary care doctor or any specialists as instructed.     Discharge Plan:   Smoking Cessation Offered: Patient Refused  Influenza Vaccine Indication: Not indicated: Previously immunized this influenza season and > 8 years of age    I understand that a diet low in cholesterol, fat, and sodium is recommended for good health. Unless I have been given specific instructions below for another diet, I accept this instruction as my diet prescription.   Other diet: Cardiac/Low Sodium        · Is patient discharged on Warfarin / Coumadin?   No     Depression / Suicide Risk    As you are discharged from this [facility] facility, it is important to learn how to keep safe from harming yourself.    Recognize the warning signs:  · Abrupt changes in personality, positive or negative- including increase in energy   · Giving away possessions  · Change in eating patterns- significant weight changes-  positive or negative  · Change in sleeping patterns- unable to sleep or sleeping all the time   · Unwillingness or inability to communicate  · Depression  · Unusual sadness, discouragement and loneliness  · Talk of wanting to die  · Neglect of personal appearance   · Rebelliousness- reckless behavior  · Withdrawal from people/activities they love  · Confusion- inability to concentrate     If you or a loved one observes any of these behaviors or has concerns about self-harm, here's what you can do:  · Talk about it- your feelings and reasons for harming yourself  · Remove any means that you might use to hurt yourself (examples: pills, rope, extension cords, firearm)  · Get professional help from the community (Mental Health, Substance Abuse, psychological counseling)  · Do not be alone:Call your Safe Contact- someone whom you  trust who will be there for you.  · Call your local CRISIS HOTLINE 441-8762 or 632-554-9838  · Call your local Children's Mobile Crisis Response Team [place] (611) 397-7182 or www.wooju  · Call the toll free National Suicide Prevention Hotlines   · National Suicide Prevention Lifeline 861-935-JPOP (0947)  [facility] Line Network 800-SUICIDE (703-0464)Coronary Artery Disease, Female  Coronary artery disease (CAD) is a process in which the blood vessels of the heart (coronary arteries) become narrow or blocked. The narrowing or blockage can lead to decreased blood flow to the heart muscle (angina). Symptoms known as angina can develop if the blood flow is reduced to the heart for a short period of time. Prolonged reduced blood flow can cause a heart attack (myocardial infarction, MI).  CAD is a leading cause of death for women. More women die from CAD than from cancer, lung disease, and accidents combined. It is important for women to understand the risks, symptoms, and treatment options for CAD.  What are the causes?  Atherosclerosis is the cause of CAD. Atherosclerosis is the buildup of fat and cholesterol (plaque) on the inside of the arteries. Over time, the plaque may narrow or block the artery, and this will lessen blood flow to the heart. Plaque can also become weak and break off within a coronary artery to form a clot and cause a sudden blockage.  What increases the risk?  Many risk factors increase your chances of getting CAD, including:  High cholesterol levels.  High blood pressure (hypertension).  Tobacco use.  Diabetes.  Age. Women over age 55 are at a greater risk of CAD.  Menopause.  All postmenopausal women are at greater risk of CAD.  Women who have experienced menopause between the ages of 40-45 (early menopause) are at a higher risk of CAD.  Women who have experienced menopause before age 40 (premature menopause) are at an extremely high risk of CAD.  Family history of  CAD.  Obesity.  Lack of exercise.  A diet high in saturated fats.  What are the signs or symptoms?  Many people do not experience any symptoms during the early stages of CAD. As the condition progresses, symptoms may include:  Chest pain.  The pain can be described as crushing or squeezing, or a tightness, pressure, fullness, or heaviness in the chest.  The pain can last more than a few minutes or can stop and recur.  Pain in the arms, neck, jaw, or back.  Unexplained heartburn or indigestion.  Shortness of breath.  Nausea.  Sudden cold sweats.  Sudden light-headedness.  Many women have chest discomfort and the other symptoms. However, women often have different (atypical) symptoms, such as:  Fatigue.  Unexplained feelings of nervousness or anxiety.  Unexplained weakness.  Dizziness or fainting.  Sometimes, women may not have any symptoms of CAD.  How is this diagnosed?  Tests to diagnose CAD may include:  ECG (electrocardiogram).  Exercise stress test. This looks for signs of blockage when the heart is being exercised.  Pharmacologic stress test. This test looks for signs of blockage when the heart is being stressed with a medicine.  Blood tests.  Coronary angiogram. This is a procedure to look at the coronary arteries to see if there is any blockage.  How is this treated?  The treatment of CAD may include the following:  Healthy behavioral changes to reduce or control risk factors.  Medicine.  Coronary stenting. A stent helps to keep an artery open.  Coronary angioplasty. This procedure widens a narrowed or blocked artery.  Coronary artery bypass surgery. This will allow your blood to pass the blockage (bypass) to reach your heart.  Follow these instructions at home:  Take medicines only as directed by your health care provider.  Do not take the following medicines unless your health care provider approves:  Nonsteroidal anti-inflammatory drugs (NSAIDs), such as ibuprofen, naproxen, or celecoxib.  Vitamin  supplements that contain vitamin A, vitamin E, or both.  Hormone replacement therapy that contains estrogen with or without progestin.  Manage other health conditions such as hypertension and diabetes as directed by your health care provider.  Follow a heart-healthy diet. A dietitian can help to educate you about healthy food options and changes.  Use healthy cooking methods such as roasting, grilling, broiling, baking, poaching, steaming, or stir-frying. Talk to a dietitian to learn more about healthy cooking methods.  Follow an exercise program approved by your health care provider.  Maintain a healthy weight. Lose weight as approved by your health care provider.  Plan rest periods when fatigued.  Learn to manage stress.  Do not use any tobacco products, including cigarettes, chewing tobacco, or electronic cigarettes. If you need help quitting, ask your health care provider.  If you drink alcohol, and your health care provider approves, limit your alcohol intake to no more than 1 drink per day. One drink equals 12 ounces of beer, 5 ounces of wine, or 1½ ounces of hard liquor.  Stop illegal drug use.  Your health care provider may ask you to monitor your blood pressure. A blood pressure reading consists of a higher number over a lower number, such as 110 over 72, which is written as 110/72. Ideally, your blood pressure should be:  Below 140/90 if you have no other medical conditions.  Below 130/80 if you have diabetes or kidney disease.  Keep all follow-up visits as directed by your health care provider. This is important.  Get help right away if:  You have pain in your chest, neck, arm, jaw, stomach, or back that lasts more than a few minutes, is recurring, or is unrelieved by taking medicine under your tongue (sublingual nitroglycerin).  You have profuse sweating without cause.  You have unexplained:  Heartburn or indigestion.  Shortness of breath or difficulty breathing.  Nausea or vomiting.  Fatigue.  Feelings  of nervousness or anxiety.  Weakness.  Diarrhea.  You have sudden light-headedness or dizziness.  You faint.  These symptoms may represent a serious problem that is an emergency. Do not wait to see if the symptoms will go away. Get medical help right away. Call your local emergency services (911 in the [place] Do not drive yourself to the hospital.   This information is not intended to replace advice given to you by your health care provider. Make sure you discuss any questions you have with your health care provider.  Document Released: 03/11/2013 Document Revised: 05/25/2017 Document Reviewed: 04/21/2015  [employer] Interactive Patient Education © 2017 [employer]  ·   Coronary Artery Disease, Female  Coronary artery disease (CAD) is a process in which the blood vessels of the heart (coronary arteries) become narrow or blocked. The narrowing or blockage can lead to decreased blood flow to the heart muscle (angina). Symptoms known as angina can develop if the blood flow is reduced to the heart for a short period of time. Prolonged reduced blood flow can cause a heart attack (myocardial infarction, MI).  CAD is a leading cause of death for women. More women die from CAD than from cancer, lung disease, and accidents combined. It is important for women to understand the risks, symptoms, and treatment options for CAD.  What are the causes?  Atherosclerosis is the cause of CAD. Atherosclerosis is the buildup of fat and cholesterol (plaque) on the inside of the arteries. Over time, the plaque may narrow or block the artery, and this will lessen blood flow to the heart. Plaque can also become weak and break off within a coronary artery to form a clot and cause a sudden blockage.  What increases the risk?  Many risk factors increase your chances of getting CAD, including:  · High cholesterol levels.  · High blood pressure (hypertension).  · Tobacco use.  · Diabetes.  · Age. Women over age 55 are at a greater risk of  CAD.  · Menopause.  ¨ All postmenopausal women are at greater risk of CAD.  ¨ Women who have experienced menopause between the ages of 40-45 (early menopause) are at a higher risk of CAD.  ¨ Women who have experienced menopause before age 40 (premature menopause) are at an extremely high risk of CAD.  · Family history of CAD.  · Obesity.  · Lack of exercise.  · A diet high in saturated fats.  What are the signs or symptoms?  Many people do not experience any symptoms during the early stages of CAD. As the condition progresses, symptoms may include:  · Chest pain.  ¨ The pain can be described as crushing or squeezing, or a tightness, pressure, fullness, or heaviness in the chest.  ¨ The pain can last more than a few minutes or can stop and recur.  · Pain in the arms, neck, jaw, or back.  · Unexplained heartburn or indigestion.  · Shortness of breath.  · Nausea.  · Sudden cold sweats.  · Sudden light-headedness.  Many women have chest discomfort and the other symptoms. However, women often have different (atypical) symptoms, such as:  · Fatigue.  · Unexplained feelings of nervousness or anxiety.  · Unexplained weakness.  · Dizziness or fainting.  Sometimes, women may not have any symptoms of CAD.  How is this diagnosed?  Tests to diagnose CAD may include:  · ECG (electrocardiogram).  · Exercise stress test. This looks for signs of blockage when the heart is being exercised.  · Pharmacologic stress test. This test looks for signs of blockage when the heart is being stressed with a medicine.  · Blood tests.  · Coronary angiogram. This is a procedure to look at the coronary arteries to see if there is any blockage.  How is this treated?  The treatment of CAD may include the following:  · Healthy behavioral changes to reduce or control risk factors.  · Medicine.  · Coronary stenting. A stent helps to keep an artery open.  · Coronary angioplasty. This procedure widens a narrowed or blocked artery.  · Coronary artery bypass  surgery. This will allow your blood to pass the blockage (bypass) to reach your heart.  Follow these instructions at home:  · Take medicines only as directed by your health care provider.  · Do not take the following medicines unless your health care provider approves:  ¨ Nonsteroidal anti-inflammatory drugs (NSAIDs), such as ibuprofen, naproxen, or celecoxib.  ¨ Vitamin supplements that contain vitamin A, vitamin E, or both.  ¨ Hormone replacement therapy that contains estrogen with or without progestin.  · Manage other health conditions such as hypertension and diabetes as directed by your health care provider.  · Follow a heart-healthy diet. A dietitian can help to educate you about healthy food options and changes.  · Use healthy cooking methods such as roasting, grilling, broiling, baking, poaching, steaming, or stir-frying. Talk to a dietitian to learn more about healthy cooking methods.  · Follow an exercise program approved by your health care provider.  · Maintain a healthy weight. Lose weight as approved by your health care provider.  · Plan rest periods when fatigued.  · Learn to manage stress.  · Do not use any tobacco products, including cigarettes, chewing tobacco, or electronic cigarettes. If you need help quitting, ask your health care provider.  · If you drink alcohol, and your health care provider approves, limit your alcohol intake to no more than 1 drink per day. One drink equals 12 ounces of beer, 5 ounces of wine, or 1½ ounces of hard liquor.  · Stop illegal drug use.  · Your health care provider may ask you to monitor your blood pressure. A blood pressure reading consists of a higher number over a lower number, such as 110 over 72, which is written as 110/72. Ideally, your blood pressure should be:  ¨ Below 140/90 if you have no other medical conditions.  ¨ Below 130/80 if you have diabetes or kidney disease.  · Keep all follow-up visits as directed by your health care provider. This is  important.  Get help right away if:  · You have pain in your chest, neck, arm, jaw, stomach, or back that lasts more than a few minutes, is recurring, or is unrelieved by taking medicine under your tongue (sublingual nitroglycerin).  · You have profuse sweating without cause.  · You have unexplained:  ¨ Heartburn or indigestion.  ¨ Shortness of breath or difficulty breathing.  ¨ Nausea or vomiting.  ¨ Fatigue.  ¨ Feelings of nervousness or anxiety.  ¨ Weakness.  ¨ Diarrhea.  · You have sudden light-headedness or dizziness.  · You faint.  These symptoms may represent a serious problem that is an emergency. Do not wait to see if the symptoms will go away. Get medical help right away. Call your local emergency services (911 in the [place] Do not drive yourself to the hospital.   This information is not intended to replace advice given to you by your health care provider. Make sure you discuss any questions you have with your health care provider.  Document Released: 03/11/2013 Document Revised: 05/25/2017 Document Reviewed: 04/21/2015  [employer] Interactive Patient Education © 2017 [employer]

## 2018-04-20 NOTE — PROGRESS NOTES
Report received from ENRIQUE Pisano. Pt is sitting up in bed, watching tv-- denies current needs. Call light is in reach and bed exit is on.     12 hour chart check

## 2018-04-20 NOTE — DISCHARGE PLANNING
Medical SW    Sw attended AM IDT Rounds.    RN reports, pt on room air, cardiology will decide d/c, NSTEMI, not candidate for CABG, stent in place, can transfer to lower level of care in [facility],    Heart surgeon indicates pt is safe to d/c home.     Plan: Sw to assist w/ d/c planning as needed.

## 2018-04-20 NOTE — PROGRESS NOTES
Pulmonary Critical Care Progress Note        Date of Admission:  4/19/2018    Chief Complaint: Chest pain    History of Present Illness: 67-year-old lady   with a history of coronary artery disease and prior percutaneous coronary   intervention.  She presented to the emergency room today with chest pain.  She   describes the pain as a heaviness and pressure in the center of her chest,   which radiates to the left lateral chest as well as her left shoulder, left   arm and the left side of her neck.  There was no dyspnea, nausea, vomiting or   diaphoresis associated with the pain.  She presented to the emergency room,   she received sublingual nitroglycerin as well as intravenous nitroglycerin and   ultimately had relief of her chest pain.  She was then emergently taken to   the cardiac catheterization laboratory where she was found to have severe   3-vessel coronary artery disease including her left anterior descending   artery, mid vessel in-stent restenosis with chronic total occlusion of the   distal right coronary artery with severe extensive distal disease, severe   stenosis of her circumflex artery and right coronary artery.  Her left   ventricular ejection fraction was 60%.  No percutaneous coronary intervention   was performed due to her difficult anatomy.  She is now in the cardiac   intensive care unit and I have been asked to assist in her care and   management.    ROS:  Respiratory: negative, Cardiac: negative, GI: negative.  All other systems negative.    Interval Events:  24 hour interval history reviewed    - replace K and Mg   - heparin gtt  Yesterday    - a/o   - afebrile   - meir PO   - no CP, hemodynamically stable   - declines intervention   - 2 lpm    - heparin gtt   - asa, statin, bb   - ntg off -    - OK to transfer to tele   - heparin gtt      PFSH:  No change.    Respiratory:     Pulse Oximetry: 94 %  Chest Tube Drains:          Exam: unlabored respirations, no intercostal retractions or  accessory muscle use and diminished breath sounds mild  ImagingAvailable data reviewed         Invalid input(s): [id number]    HemoDynamics:  Pulse: 66, Heart Rate (Monitored): 70  NIBP: (!) 98/53       Exam: regular rate and rhythm  Imaging: Available data reviewed  Recent Labs      04/18/18   1230  04/18/18   1713   TROPONINI  0.20*  0.34*   BNPBTYPENAT  24   --        Neuro:  GCS         Exam: no focal deficits noted  Imaging: Available data reviewed    Fluids:  Intake/Output       04/18/18 0700 - 04/19/18 0659 04/19/18 0700 - 04/20/18 0659 04/20/18 0700 - 04/21/18 0659      [id number] 1894-5539 Total 7112-7863 6783-2423 Total 1949-3622 2194-4775 Total       Intake    P.O.  --  220 220  300  750 1050  --  -- --    P.O. -- 220 220  -- -- --    I.V.  --  1569.3 1569.3  232  348 580  --  -- --    Nitroglycerin Volume -- 29.1 29.1 -- -- -- -- -- --    Heparin Volume -- 865.1 865.1 232 348 580 -- -- --    IV Volume (Normal saline) -- 675 675 -- -- -- -- -- --    Total Intake -- 1789.3 1789.3 532 1098 1630 -- -- --       Output    Urine  --  1000 1000  --  -- --  --  -- --    Number of Times Voided -- 2 x 2 x 2 x 5 x 7 x -- -- --    Void (ml) -- 1000 1000 -- -- -- -- -- --    Stool/Urine  --  -- --  0  -- 0  --  -- --    Measurable Stool (ml) -- -- -- 0 -- 0 -- -- --    Total Output -- 1000 1000 0 -- 0 -- -- --       Net I/O     -- 789.3 789.3 532 1098 1630 -- -- --           Recent Labs      04/18/18   1230  04/18/18   1713  04/19/18   0520  04/20/18   0420   SODIUM  138  139  137  138   POTASSIUM  3.9  3.8  3.7  3.9   CHLORIDE  103  107  107  107   CO2  26  23  26  26   BUN  10  9  8  8   CREATININE  0.63  0.66  0.60  0.62   MAGNESIUM  2.1   --   1.8  1.9   CALCIUM  9.6  9.5  8.6  9.2       GI/Nutrition:  Exam: abdomen is soft and non-tender  Imaging: Available data reviewed  taking PO  Liver Function  Recent Labs      04/18/18   1230  04/18/18   1713  04/19/18   0520  04/20/18   0420   ALTSGPT  14    --    --    --    ASTSGOT  22   --    --    --    ALKPHOSPHAT  67   --    --    --    TBILIRUBIN  0.5   --    --    --    GLUCOSE  117*  118*  100*  103*       Heme:  Recent Labs      18   1713   18   0520  18   0815  18   1413  18   0420   RBC  5.27   --   4.81   --    --   5.06   HEMOGLOBIN  17.1*   --   15.6   --    --   16.4*   HEMATOCRIT  50.9*   --   46.6   --    --   49.2*   PLATELETCT  200   --   154*   --    --   176   PROTHROMBTM  13.3   --    --    --    --    --    APTT   --    < >   --   86.7*  60.6*  45.4*   INR  1.04   --    --    --    --    --     < > = values in this interval not displayed.       Infectious Disease:  Temp  Av.6 °C (97.8 °F)  Min: 36.3 °C (97.4 °F)  Max: 36.7 °C (98.1 °F)  Micro: reviewed  Recent Labs      18   1230  18   1713  18   0520  18   0420   WBC  7.7  6.9  7.0  6.4   NEUTSPOLYS  55.50   --   56.90  50.40   LYMPHOCYTES  36.80   --   34.20  40.10   MONOCYTES  4.60   --   6.20  6.40   EOSINOPHILS  1.70   --   2.00  2.00   BASOPHILS  0.70   --   0.30  0.50   ASTSGOT  22   --    --    --    ALTSGPT  14   --    --    --    ALKPHOSPHAT  67   --    --    --    TBILIRUBIN  0.5   --    --    --      Current Facility-Administered Medications   Medication Dose Frequency Provider Last Rate Last Dose   • gabapentin (NEURONTIN) capsule 800 mg  800 mg TID Shobha Hernandez M.D.   800 mg at 18 2018   • metoprolol (LOPRESSOR) tablet 25 mg  25 mg TWICE DAILY Leopoldo Moreland M.D.   25 mg at 18 2018   • senna-docusate (PERICOLACE or SENOKOT S) 8.6-50 MG per tablet 2 Tab  2 Tab BID Tierra Barahona M.D.        And   • polyethylene glycol/lytes (MIRALAX) PACKET 1 Packet  1 Packet QDAY PRN Tierra Barahona M.D.        And   • magnesium hydroxide (MILK OF MAGNESIA) suspension 30 mL  30 mL QDAY PRN Tierra Barahona M.D.        And   • bisacodyl (DULCOLAX) suppository 10 mg  10 mg QDAY PRN Tierra Barahona M.D.       • Respiratory  Care per Protocol   Continuous RT Tierra Barahona M.D.       • labetalol (NORMODYNE,TRANDATE) injection 10 mg  10 mg Q4HRS PRN Tierra Barahoan M.D.       • ondansetron (ZOFRAN ODT) dispertab 4 mg  4 mg Q4HRS PRN Tierra Barahona M.D.       • nicotine (NICODERM) 21 MG/24HR 21 mg  21 mg Daily-0600 Tierra Barahona M.D.        And   • nicotine polacrilex (NICORETTE) 2 MG piece 2 mg  2 mg Q HOUR PRN Tierra Barahona M.D.       • acetaminophen (TYLENOL) tablet 650 mg  650 mg Q6HRS PRN Tierra Barahona M.D.   650 mg at 04/19/18 2014   • aspirin EC (ECOTRIN) tablet 81 mg  81 mg DAILY Tierra Barahona M.D.   81 mg at 04/19/18 0844   • nitroglycerin (NITROSTAT) tablet 0.4 mg  0.4 mg PRN Leopoldo Moreland M.D.   0.4 mg at 04/18/18 1630   • atorvastatin (LIPITOR) tablet 80 mg  80 mg QHS Tierra Barahona M.D.   80 mg at 04/19/18 2018   • morphine (pf) 4 mg/ml injection 4 mg  4 mg Q2HRS PRN Tierra Barahona M.D.   2 mg at 04/18/18 1738   • heparin injection 3,200 Units  3,200 Units PRN Carlos Garg M.D.   3,200 Units at 04/20/18 0619    And   • heparin infusion 25,000 units in 500 ml 0.45% nacl   Continuous Carlos Garg M.D. 34 mL/hr at 04/20/18 0650 1,700 Units/hr at 04/20/18 0650     Last reviewed on 4/18/2018  3:38 PM by Deya Muro    Quality  Measures:   Labs reviewed, Medications reviewed and Radiology images reviewed                      Problems:  Acute non-ST segment elevation myocardial infarction   - Unstable angina   - Now chest pain-free, discontinue nitroglycerin   - Ongoing heparin drip   - ASA, atorvastatin, metoprolol  Severe 3-vessel coronary artery disease   - Prior RCA stent   - Not amenable to PCI   - CVS eval although patient averse to open heart surgery  Ongoing tobacco use   - Counseled regarding cessation  COPD?   - Titrated oxygen, bronchodilators  Polycythemia  Remote history of deep venous thrombosis.  Peripheral neuropathy.      Discussed patient condition and risk of morbidity and/or mortality  with RN, RT and QA team.

## 2018-04-23 NOTE — DOCUMENTATION QUERY
"DOCUMENTATION QUERY    PROVIDERS: Please select “Cosign w/ note”to reply to query.    To better represent the severity of illness of your patient, please review the following information and exercise your independent professional judgment in responding to this query.     \"NSTEMI\" and \"mid vessel in-stent restenosis of left anterior descending artery\" are documented in the Procedures Note and Discharge Summary. Based upon the clinical findings, risk factors, and treatment, can the relationship between these two conditions be further specified?    • There is no relationship between the NSTEMI and in-stent restenosis of the LAD.  • The NSTEMI is likely secondary to the in-stent restenosis of the LAD.  • Other explanation of clinical findings  • Findings of no clinical significance  • Unable to determine    The medical record reflects the following:   Clinical Findings - Cardiologist noted \"significant diffuse in-stent restenosis followed by a 100% chronic total occlusion with antegrade collateral circulation \"  - Troponin: 0.20 on admission   Treatment - Selective Coronary Angiography  - Left Ventriculography  - Left heart catheterization  - EKG  - Cardiothoracic Surgery Consult  - Nitroglycerin  - Metoprolol  - Heparin Gtt   Risk Factors - hx CAD  - hx Obesity  - hx PVD  - hx Tobacco Abuse  - hx Polycythemia   Location within medical record  History and Physical, Progress Notes, Discharge Summary, Lab Results, Radiology Results and MAR.     Thank you,     KINJAL Preciado, RN  Clinical Documentation Improvement Specialist  P: [phone]  "

## 2018-04-27 ENCOUNTER — OFFICE VISIT (OUTPATIENT)
Dept: CARDIOLOGY | Facility: MEDICAL CENTER | Age: 68
End: 2018-04-27
Payer: MEDICARE

## 2018-04-27 VITALS
RESPIRATION RATE: 20 BRPM | SYSTOLIC BLOOD PRESSURE: 124 MMHG | HEART RATE: 72 BPM | HEIGHT: 65 IN | OXYGEN SATURATION: 93 % | WEIGHT: 252 LBS | BODY MASS INDEX: 41.99 KG/M2 | DIASTOLIC BLOOD PRESSURE: 76 MMHG

## 2018-04-27 DIAGNOSIS — I25.10 CAD S/P PERCUTANEOUS CORONARY ANGIOPLASTY: Chronic | ICD-10-CM

## 2018-04-27 DIAGNOSIS — Z98.61 CAD S/P PERCUTANEOUS CORONARY ANGIOPLASTY: Chronic | ICD-10-CM

## 2018-04-27 DIAGNOSIS — F17.200 TOBACCO DEPENDENCY: ICD-10-CM

## 2018-04-27 DIAGNOSIS — I24.9 ACUTE CORONARY SYNDROME (HCC): ICD-10-CM

## 2018-04-27 PROCEDURE — 99214 OFFICE O/P EST MOD 30 MIN: CPT | Performed by: NURSE PRACTITIONER

## 2018-04-27 RX ORDER — ATORVASTATIN CALCIUM 80 MG/1
80 TABLET, FILM COATED ORAL
Qty: 90 TAB | Refills: 3 | Status: SHIPPED | OUTPATIENT
Start: 2018-04-27

## 2018-04-27 RX ORDER — ASPIRIN 81 MG/1
81 TABLET ORAL DAILY
Qty: 90 TAB | Refills: 3 | Status: SHIPPED | OUTPATIENT
Start: 2018-04-27

## 2018-04-27 RX ORDER — NITROGLYCERIN 0.4 MG/1
0.4 TABLET SUBLINGUAL PRN
Qty: 25 TAB | Refills: 11 | Status: SHIPPED | OUTPATIENT
Start: 2018-04-27

## 2018-04-27 ASSESSMENT — ENCOUNTER SYMPTOMS
COUGH: 0
WEIGHT LOSS: 0
NAUSEA: 0
PALPITATIONS: 0
CHILLS: 0
HEADACHES: 0
VOMITING: 0
SHORTNESS OF BREATH: 0
DIZZINESS: 0

## 2018-04-27 NOTE — PROGRESS NOTES
Chief Complaint   Patient presents with   • Coronary Artery Disease     Subjective:   Raine Harrison is a 67 y.o. female who presents today after being hospitalized for unstable angina. The patient was taken to the Cath Lab and found to have significant three-vessel disease. It was felt the patient is not a good surgical candidate thus her medical management will be optimized.    She reports no chest pain or palpitations. She denies dizziness. She has not required nitroglycerin. We had a long discussion about the importance of complete abstinence from smoking. We have also discussed the importance of taking her medications consistently.    POST-PROCEDURE DIAGNOSES:  1.  Coronary artery disease, severe, 3-vessel including left anterior   descending artery mid vessel in-stent restenosis followed by chronic total occlusion with  severe extensive diffuse distal disease, severe stenoses of the circumflex artery, right   coronary artery with mid vessel chronic total occlusion, severe in-stent restenosis with   left-to-right collateral circulation.  2.  Left ventricular ejection fraction 60% post-PVC with akinesis of the   proximal inferior wall.POST-PROCEDURE DIAGNOSES:  1.  Coronary artery disease, severe, 3-vessel including left anterior   descending artery mid vessel in-stent restenosis followed by chronic total occlusion with  severe extensive diffuse distal disease, severe stenoses of the circumflex artery, right   coronary artery with mid vessel chronic total occlusion, severe in-stent restenosis with   left-to-right collateral circulation.  2.  Left ventricular ejection fraction 60% post-PVC with akinesis of the   proximal inferior wall.    Past Medical History:   Diagnosis Date   • Atherosclerotic heart disease of native coronary artery without angina pectoris     Coronary artery disease   • DVT (deep venous thrombosis) (CMS-[facility])    • Neuropathy (CMS-[facility])      Past Surgical History:   Procedure Laterality Date  "  • ANGIOPLASTY       History reviewed. No pertinent family history.  Social History     Social History   • Marital status: Single     Spouse name: N/A   • Number of children: N/A   • Years of education: N/A     Occupational History   • Not on file.     Social History Main Topics   • Smoking status: Current Every Day Smoker     Packs/day: 0.25     Years: 50.00     Types: Cigarettes   • Smokeless tobacco: Never Used   • Alcohol use Yes      Comment: rare   • Drug use: No   • Sexual activity: Not on file     Other Topics Concern   • Not on file     Social History Narrative   • No narrative on file     No Known Allergies  Outpatient Encounter Prescriptions as of 4/27/2018   Medication Sig Dispense Refill   • aspirin EC 81 MG EC tablet Take 1 Tab by mouth every day. 30 Tab 0   • atorvastatin (LIPITOR) 80 MG tablet Take 1 Tab by mouth every bedtime. 30 Tab 0   • metoprolol (LOPRESSOR) 25 MG Tab Take 1 Tab by mouth 2 Times a Day. 60 Tab 0   • gabapentin (NEURONTIN) 400 MG Cap Take 800 mg by mouth 4 times a day.     • omeprazole (PRILOSEC) 40 MG delayed-release capsule Take 40 mg by mouth every day.     • nitroglycerin (NITROSTAT) 0.4 MG SL Tab Place 1 Tab under tongue as needed for Chest Pain. 25 Tab 0     No facility-administered encounter medications on file as of 4/27/2018.      Review of Systems   Constitutional: Negative for chills and weight loss.   Respiratory: Negative for cough and shortness of breath.    Cardiovascular: Negative for chest pain and palpitations.   Gastrointestinal: Negative for nausea and vomiting.   Neurological: Negative for dizziness and headaches.      Objective:   /76   Pulse 72   Resp 20   Ht 1.651 m (5' 5\")   Wt 114.3 kg (252 lb)   SpO2 93%   BMI 41.93 kg/m²     Physical Exam   Constitutional: She is oriented to person, place, and time. She appears well-developed and well-nourished.   HENT:   Head: Normocephalic.   Eyes: EOM are normal.   Cardiovascular: Normal rate, regular " rhythm, normal heart sounds and intact distal pulses.    No murmur heard.  Pulmonary/Chest: Effort normal and breath sounds normal.   Abdominal: Soft. Bowel sounds are normal.   Musculoskeletal: She exhibits no edema.   Neurological: She is alert and oriented to person, place, and time.   Skin: Skin is warm and dry.   Psychiatric: She has a normal mood and affect. Her behavior is normal. Judgment and thought content normal.   Nursing note and vitals reviewed.    Assessment:     1. Acute coronary syndrome (CMS-[facility])     2. CAD S/P percutaneous coronary angioplasty     3. Tobacco dependency       Medical Decision Making:  Today's Assessment / Status / Plan:   1. CAD S/P stent to RCA stent in the past  -Multivessel disease requiring medical management, not surgical candidate  -Continue ASA 81, S2 atorvastatin 80 mg, and metoprolol 25 mg twice a day  -Discussed importance of weight loss and dietary restrictions to help lower cholesterol    2. Tobacco dependency  -Shortly encouraged patient to discontinue smoking.  -She is currently smoking 3 cigarettes per day    Collaborating MD is Dr. Keith.

## 2018-04-30 NOTE — DOCUMENTATION QUERY
"DOCUMENTATION QUERY     PROVIDERS: Please select “Cosign w/ note”to reply to query.     To better represent the severity of illness of your patient, please review the following information and exercise your independent professional judgment in responding to this query.      \"NSTEMI\" and \"mid vessel in-stent restenosis of left anterior descending artery\" are documented in the Procedures Note and Discharge Summary. Based upon the clinical findings, risk factors, and treatment, can the relationship between these two conditions be further specified?     · There is no relationship between the NSTEMI and in-stent restenosis of the LAD.  · The NSTEMI is likely secondary to the in-stent restenosis of the LAD.  · Other explanation of clinical findings  · Findings of no clinical significance  · Unable to determine     The medical record reflects the following:   Clinical Findings - Cardiologist noted \"significant diffuse in-stent restenosis followed by a 100% chronic total occlusion with antegrade collateral circulation \"  - Troponin: 0.20 on admission   Treatment - Selective Coronary Angiography  - Left Ventriculography  - Left heart catheterization  - EKG  - Cardiothoracic Surgery Consult  - Nitroglycerin  - Metoprolol  - Heparin Gtt   Risk Factors - hx CAD  - hx Obesity  - hx PVD  - hx Tobacco Abuse  - hx Polycythemia   Location within medical record  History and Physical, Progress Notes, Discharge Summary, Lab Results, Radiology Results and MAR.      Thank you,      KINJAL Preciado, RN  Clinical Documentation Improvement Specialist  P: [phone]    "

## 2018-05-09 NOTE — ADDENDUM NOTE
Encounter addended by: Artem Amos R.N. on: 5/9/2018  6:36 AM<BR>    Actions taken: Sign clinical note

## 2018-05-09 NOTE — DOCUMENTATION QUERY
"DOCUMENTATION QUERY     PROVIDERS: Please select “Cosign w/ note”to reply to query.     To better represent the severity of illness of your patient, please review the following information and exercise your independent professional judgment in responding to this query.      \"NSTEMI\" and \"mid vessel in-stent restenosis of left anterior descending artery\" are documented in the Procedures Note and Discharge Summary. Based upon the clinical findings, risk factors, and treatment, can the relationship between these two conditions be further specified?     · There is no relationship between the NSTEMI and in-stent restenosis of the LAD.  · The NSTEMI is likely secondary to the in-stent restenosis of the LAD.  · Other explanation of clinical findings  · Findings of no clinical significance  · Unable to determine     The medical record reflects the following:   Clinical Findings - Cardiologist noted \"significant diffuse in-stent restenosis followed by a 100% chronic total occlusion with antegrade collateral circulation \"  - Troponin: 0.20 on admission   Treatment - Selective Coronary Angiography  - Left Ventriculography  - Left heart catheterization  - EKG  - Cardiothoracic Surgery Consult  - Nitroglycerin  - Metoprolol  - Heparin Gtt   Risk Factors - hx CAD  - hx Obesity  - hx PVD  - hx Tobacco Abuse  - hx Polycythemia   Location within medical record  History and Physical, Progress Notes, Discharge Summary, Lab Results, Radiology Results and MAR.      Thank you,      KINJAL Preciado, RN  Clinical Documentation Improvement Specialist  P: [phone]    "

## 2018-05-14 ENCOUNTER — TELEPHONE (OUTPATIENT)
Dept: CARDIOLOGY | Facility: MEDICAL CENTER | Age: 68
End: 2018-05-14

## 2018-05-14 NOTE — TELEPHONE ENCOUNTER
"Raine Kovacs, VALERIE. 2 days ago        My PCP has given me a script for Bupropion ER/ mp (Wellbutrin) to help me to quit smoking again.   I noticed that my heart rate has started to drop below 60 BPM. My sister-in-law read online that the combination of Wellbuterin and Metroprol can cause lowered heart rate. Started script 5/4/18.   5/9 heart rate down from 80 BMP on 5/4 to 64 bmp. 05/10-60bmp. 5/11-5/12: 58 bmp.   Please review and contact me if adjustments need to be made to my meds. PCP = Hamzah Barron of [facility].   [phone] Cell           To DB to advise (also sent tiger text)    ___________________________________    Per APN, keep monitoring BP, no change in dose.  ___________________________________    patient calling about Molecular Imaging message she sent today   Received: Today   Message Contents   DORIS Juarez/Lucy       Patient is calling about PLUQ message she sent earlier today about her Metoprolol. She can be reached at [phone].      Attempted to contact, no answer.  Left VM to call back, or check Molecular Imaging.  _______________________________________    Pt returning your call   Received: Today   Message Contents   Katie Borrero R.N.   Phone Number: [phone]             EDMUNDO/Lucy     Pt is returning your call, she can be reached at [phone].      Contacted patient, She states she was told her HR shouldn't drop under 60. Explained a HR of 58 is not dangerous.  Inquired if patient is symptomatic.  Patient confirms she feels light-headed, dizzy. Denies CP or nausea.     Advised to d/c the Wellbutrin and call PCP.   Patient was on speaker phone.  The friend or family member in background raised voice and stated, \"why are doctors contradicting.  She is trying to better her health and she needs to quit smoking.  Can't she change her metoprolol to another beta blocker?\".   Explained will need to seek advice " from APN.  Patient refusing to receive advice from APN and asking to speak to Dr. Moreland (who she has an appointment with on 8/20/18.  Dr. Moreland had rounded on patient in hospital).    Advised to monitor HR and BP before taking her medication and hold Wellbutrin until further advice obtained. Patient verbalizes understanding.     To Dr. Moreland to advise

## 2018-05-15 NOTE — TELEPHONE ENCOUNTER
Message   Received: Today   Message Contents   EBONI Olivas R.N.   Caller: Unspecified (Yesterday,  3:47 PM)             Let us know if it drops below 50      Contacted patient, she states she hasn't taken the wellbutrin in 2 days and her HR 53 this morning.     Advised to speak to PCP for other smoking cessation options.  Inquired if she had looked into other options to quit smoking.  She states the patch makes her nauseous.  Educated on nicotine mints or gum.      Advised to check HR/BP before taking medications and call the clinic if her HR drops below 50. Stay hydrated.  Patient verbalizes understanding.

## 2018-08-25 ENCOUNTER — OFFICE VISIT (OUTPATIENT)
Dept: URGENT CARE | Facility: PHYSICIAN GROUP | Age: 68
End: 2018-08-25
Payer: MEDICARE

## 2018-08-25 VITALS
BODY MASS INDEX: 42.38 KG/M2 | TEMPERATURE: 98.1 F | RESPIRATION RATE: 16 BRPM | WEIGHT: 254.4 LBS | SYSTOLIC BLOOD PRESSURE: 140 MMHG | DIASTOLIC BLOOD PRESSURE: 80 MMHG | OXYGEN SATURATION: 95 % | HEIGHT: 65 IN | HEART RATE: 70 BPM

## 2018-08-25 DIAGNOSIS — J06.9 URI WITH COUGH AND CONGESTION: ICD-10-CM

## 2018-08-25 DIAGNOSIS — R19.7 NAUSEA VOMITING AND DIARRHEA: ICD-10-CM

## 2018-08-25 DIAGNOSIS — R11.2 NAUSEA VOMITING AND DIARRHEA: ICD-10-CM

## 2018-08-25 PROCEDURE — 99214 OFFICE O/P EST MOD 30 MIN: CPT | Performed by: PHYSICIAN ASSISTANT

## 2018-08-25 RX ORDER — ONDANSETRON 4 MG/1
4 TABLET, ORALLY DISINTEGRATING ORAL EVERY 8 HOURS PRN
Qty: 20 TAB | Refills: 0 | Status: SHIPPED | OUTPATIENT
Start: 2018-08-25 | End: 2018-11-28

## 2018-08-25 NOTE — LETTER
August 25, 2018         Patient: Raine Harrison   YOB: 1950   Date of Visit: 8/25/2018           To Whom it May Concern:    Raine Harrison was seen in my clinic on 8/25/2018. She may return to work on 8/25/18 (Saturday night).  Please excuse her for missing 8/24.    If you have any questions or concerns, please don't hesitate to call.        Sincerely,           Zoe Cook P.A.-C.  Electronically Signed

## 2018-08-25 NOTE — LETTER
August 25, 2018         Patient: Raine Harrison   YOB: 1950   Date of Visit: 8/25/2018           To Whom it May Concern:    Raine Harrison was seen in my clinic on 8/25/2018. She may return to work on 8/25/18 (Saturday night).     If you have any questions or concerns, please don't hesitate to call.        Sincerely,           Zoe Cook P.A.-C.  Electronically Signed

## 2018-08-25 NOTE — PROGRESS NOTES
Chief Complaint   Patient presents with   • Cough       HISTORY OF PRESENT ILLNESS: Patient is a 67 y.o. female who presents today for the following:    Cough x 2 days - improving  N/V/D x 2 days  Vomiting: 10-12 x/day; none today  Diarrhea: 4-5 x/day; improved  NO blood in emesis/stool  Denies localized abdominal pain  Fever 100-100.4 2 days ago  Nothing today  OTC meds tried: Nyquil/dayquil  Denies recent travel, recent abx, sick contacts  Needs to go to work tonight     Patient Active Problem List    Diagnosis Date Noted   • Acute coronary syndrome ([facility]) 04/18/2018     Priority: High   • CAD S/P percutaneous coronary angioplasty 04/18/2018     Priority: Medium   • Tobacco dependency 04/18/2018     Priority: Low   • Peripheral neuropathy ([facility]) 04/18/2018     Priority: Low   • Morbid obesity with BMI of 40.0-44.9, adult ([facility]) 09/07/2017       Allergies:Patient has no known allergies.    Current Outpatient Prescriptions Ordered in [facility]   Medication Sig Dispense Refill   • ondansetron (ZOFRAN ODT) 4 MG TABLET DISPERSIBLE Take 1 Tab by mouth every 8 hours as needed for Nausea. 20 Tab 0   • atorvastatin (LIPITOR) 80 MG tablet Take 1 Tab by mouth every bedtime. 90 Tab 3   • aspirin 81 MG EC tablet Take 1 Tab by mouth every day. 90 Tab 3   • metoprolol (LOPRESSOR) 25 MG Tab Take 1 Tab by mouth 2 Times a Day. 180 Tab 3   • nitroglycerin (NITROSTAT) 0.4 MG SL Tab Place 1 Tab under tongue as needed for Chest Pain. 25 Tab 11   • gabapentin (NEURONTIN) 400 MG Cap Take 800 mg by mouth 4 times a day.     • omeprazole (PRILOSEC) 40 MG delayed-release capsule Take 40 mg by mouth every day.       No current [facility]-ordered facility-administered medications on file.        Past Medical History:   Diagnosis Date   • Atherosclerotic heart disease of native coronary artery without angina pectoris     Coronary artery disease   • DVT (deep venous thrombosis) ([facility])    • Neuropathy ([facility])        Social History   Substance Use Topics   •  "Smoking status: Current Every Day Smoker     Packs/day: 0.25     Years: 50.00     Types: Cigarettes   • Smokeless tobacco: Never Used   • Alcohol use Yes      Comment: rare       No family status information on file.   History reviewed. No pertinent family history.    Review of Systems:   Constitutional ROS: No unexpected change in weight, No weakness, No fatigue  Eye ROS: No recent significant change in vision, No eye pain, redness, discharge  Ear ROS: No drainage, No tinnitus or vertigo, No recent change in hearing  Mouth/Throat ROS: No teeth or gum problems, No bleeding gums, No tongue complaints  Neck ROS: No swollen glands, No significant pain in neck  Cardiovascular ROS: No diaphoresis, No edema, No palpitations  Musculoskeletal/Extremities ROS: No peripheral edema, No pain, redness or swelling on the joints  Hematologic/Lymphatic ROS: No chills, No night sweats, No weight loss  Skin/Integumentary ROS: No edema, No evidence of rash, No itching      Exam:  Blood pressure 140/80, pulse 70, temperature 36.7 °C (98.1 °F), resp. rate 16, height 1.651 m (5' 5\"), weight 115.4 kg (254 lb 6.4 oz), SpO2 95 %.  General: Well developed, well nourished. No distress.  Eye: PERRL/EOMI; conjunctivae clear, lids normal.  ENMT: Lips without lesions, MMM. Oropharynx is clear. Bilateral TMs are within normal limits.  Pulmonary: Unlabored respiratory effort. Lungs clear to auscultation, no wheezes, no rhonchi.  Cardiovascular: Regular rate and rhythm without murmur.   Abdomen: Soft, non-tender, nondistended. No hepatosplenomegaly.   Neurologic: Grossly nonfocal. No facial asymmetry noted.  Lymph: No cervical lymphadenopathy noted.  Skin: Warm, dry, good turgor. No rashes in visible areas.   Psych: Normal mood. Alert and oriented x3. Judgment and insight is normal.    Assessment/Plan:  Discussed possible viral etiology of both respiratory symptoms and the nausea, vomiting, diarrhea. Discussed appropriate over-the-counter " symptomatic medication, and when to return to clinic.  Use all medication as directed.  Drink plenty fluids.  [place] diet.  Follow-up for worsening or persistent symptoms.  1. Nausea vomiting and diarrhea  ondansetron (ZOFRAN ODT) 4 MG TABLET DISPERSIBLE   2. URI with cough and congestion

## 2018-10-16 ENCOUNTER — OFFICE VISIT (OUTPATIENT)
Dept: URGENT CARE | Facility: PHYSICIAN GROUP | Age: 68
End: 2018-10-16
Payer: MEDICARE

## 2018-10-16 VITALS
WEIGHT: 240 LBS | HEART RATE: 74 BPM | BODY MASS INDEX: 39.94 KG/M2 | SYSTOLIC BLOOD PRESSURE: 124 MMHG | DIASTOLIC BLOOD PRESSURE: 88 MMHG | TEMPERATURE: 98.8 F | RESPIRATION RATE: 14 BRPM | OXYGEN SATURATION: 96 %

## 2018-10-16 DIAGNOSIS — B96.89 ACUTE BACTERIAL SINUSITIS: ICD-10-CM

## 2018-10-16 DIAGNOSIS — J01.90 ACUTE BACTERIAL SINUSITIS: ICD-10-CM

## 2018-10-16 DIAGNOSIS — J20.9 ACUTE BRONCHITIS, UNSPECIFIED ORGANISM: ICD-10-CM

## 2018-10-16 DIAGNOSIS — R05.9 COUGH: ICD-10-CM

## 2018-10-16 DIAGNOSIS — J22 ACUTE LOWER RESPIRATORY INFECTION: ICD-10-CM

## 2018-10-16 PROCEDURE — 99214 OFFICE O/P EST MOD 30 MIN: CPT | Performed by: FAMILY MEDICINE

## 2018-10-16 RX ORDER — CODEINE PHOSPHATE AND GUAIFENESIN 10; 100 MG/5ML; MG/5ML
SOLUTION ORAL
Qty: 200 ML | Refills: 0 | Status: SHIPPED | OUTPATIENT
Start: 2018-10-16 | End: 2018-10-24

## 2018-10-16 RX ORDER — PREDNISONE 20 MG/1
TABLET ORAL
Qty: 5 TAB | Refills: 0 | Status: SHIPPED | OUTPATIENT
Start: 2018-10-16

## 2018-10-16 RX ORDER — AZITHROMYCIN 250 MG/1
TABLET, FILM COATED ORAL
Qty: 6 TAB | Refills: 0 | Status: SHIPPED | OUTPATIENT
Start: 2018-10-16 | End: 2018-11-28

## 2018-10-16 NOTE — PROGRESS NOTES
Chief Complaint:    Chief Complaint   Patient presents with   • Cough     x2wks with mucus        History of Present Illness:    This is a new problem. Symptoms x 2 weeks; has nasal symptoms with purulent mucus from nose, cough productive of purulent mucus (interferes with sleep), and wheezing. No shortness of breath. Symptoms are at least moderate severity and not getting better.      Review of Systems:    Constitutional: Negative for fever, chills, and diaphoresis.   Eyes: Negative for change in vision, photophobia, pain, redness, and discharge.  ENT: See HPI.  Respiratory: See HPI.    Cardiovascular: Negative for chest pain, palpitations, orthopnea, claudication, leg swelling, and PND.   Gastrointestinal: Negative for abdominal pain, nausea, vomiting, diarrhea, constipation, blood in stool, and melena.   Genitourinary: Negative for dysuria, urinary urgency, urinary frequency, hematuria, and flank pain.   Musculoskeletal: Negative for myalgias, joint pain, neck pain, and back pain.   Skin: Negative for rash and itching.   Neurological: No new symptoms.  Endo: Negative for polydipsia.   Heme: Does not bruise/bleed easily.   Psychiatric/Behavioral: Negative for depression, suicidal ideas, hallucinations, memory loss and substance abuse. The patient is not nervous/anxious and does not have insomnia.        Past Medical History:    Past Medical History:   Diagnosis Date   • Atherosclerotic heart disease of native coronary artery without angina pectoris     Coronary artery disease   • DVT (deep venous thrombosis) ([facility])    • Neuropathy (HCC)      Past Surgical History:    Past Surgical History:   Procedure Laterality Date   • ANGIOPLASTY       Social History:    Social History     Social History   • Marital status: Single     Spouse name: N/A   • Number of children: N/A   • Years of education: N/A     Occupational History   • Not on file.     Social History Main Topics   • Smoking status: Current Every Day Smoker      Packs/day: 0.25     Years: 50.00     Types: Cigarettes   • Smokeless tobacco: Never Used   • Alcohol use Yes      Comment: rare   • Drug use: No   • Sexual activity: Not on file     Other Topics Concern   • Not on file     Social History Narrative   • No narrative on file     Family History:    History reviewed. No pertinent family history.    Medications:    Current Outpatient Prescriptions on File Prior to Visit   Medication Sig Dispense Refill   • atorvastatin (LIPITOR) 80 MG tablet Take 1 Tab by mouth every bedtime. 90 Tab 3   • aspirin 81 MG EC tablet Take 1 Tab by mouth every day. 90 Tab 3   • metoprolol (LOPRESSOR) 25 MG Tab Take 1 Tab by mouth 2 Times a Day. 180 Tab 3   • nitroglycerin (NITROSTAT) 0.4 MG SL Tab Place 1 Tab under tongue as needed for Chest Pain. 25 Tab 11   • gabapentin (NEURONTIN) 400 MG Cap Take 800 mg by mouth 4 times a day.     • omeprazole (PRILOSEC) 40 MG delayed-release capsule Take 40 mg by mouth every day.     • ondansetron (ZOFRAN ODT) 4 MG TABLET DISPERSIBLE Take 1 Tab by mouth every 8 hours as needed for Nausea. 20 Tab 0     No current facility-administered medications on file prior to visit.      Allergies:    No Known Allergies      Vitals:    Vitals:    10/16/18 1400   BP: 124/88   BP Location: Left arm   Patient Position: Sitting   BP Cuff Size: Large adult   Pulse: 74   Resp: 14   Temp: 37.1 °C (98.8 °F)   TempSrc: Temporal   SpO2: 96%   Weight: 108.9 kg (240 lb)       Physical Exam:    Constitutional: Vital signs reviewed. Appears well-developed and well-nourished. Fatigued, occl cough. No acute distress.   Eyes: Sclera white, conjunctivae clear.   ENT: External ears normal. External auditory canals normal without discharge. TMs translucent and non-bulging. Hearing normal. Nasal mucosa erythematous. Lips/teeth are normal. Oral mucosa pink and moist. Posterior pharynx: WNL.  Neck: Neck supple.   Cardiovascular: Regular rate and rhythm. No murmur.  Pulmonary/Chest:  Respirations non-labored. Mild wheezing bilaterally.  Lymph: Cervical nodes without tenderness or enlargement.  Musculoskeletal: Normal gait. Normal range of motion. No muscular atrophy or weakness.  Neurological: Alert and oriented to person, place, and time. Muscle tone normal. Coordination normal.   Skin: No rashes or lesions. Warm, dry, normal turgor.  Psychiatric: Normal mood and affect. Behavior is normal. Judgment and thought content normal.       Assessment / Plan:    1. Acute bacterial sinusitis  - azithromycin (ZITHROMAX) 250 MG Tab; 2 TABS ON DAY 1, 1 TAB ON DAYS 2-5.  Dispense: 6 Tab; Refill: 0    2. Acute lower respiratory infection  - azithromycin (ZITHROMAX) 250 MG Tab; 2 TABS ON DAY 1, 1 TAB ON DAYS 2-5.  Dispense: 6 Tab; Refill: 0    3. Acute bronchitis, unspecified organism  - predniSONE (DELTASONE) 20 MG Tab; 1 TAB BY MOUTH ONCE A DAY X 5 DAYS TO HELP NASAL SYMPTOMS AND WHEEZING. TAKE WITH FOOD.  Dispense: 5 Tab; Refill: 0    4. Cough  - guaifenesin-codeine (ROBITUSSIN AC) Solution oral solution; 5-10 ML (1-2 TEASPOONS) EVERY 6 HOURS FOR UP TO 7 DAYS ONLY IF NEEDED FOR COUGH. MAY CAUSE DROWSINESS.  Dispense: 200 mL; Refill: 0      Discussed with her DDX, management options, and risks, benefits, and alternatives to treatment plan agreed upon.    Agreeable to medications prescribed.  report checked - last Rx was Oxycodone-APAP 5-325 mg #20 on 11/3/16.    Discussed expected course of duration, time for improvement, and to seek follow-up in Emergency Room, urgent care, or with PCP if getting worse at any time or not improving within expected time frame.

## 2018-11-28 ENCOUNTER — TELEPHONE (OUTPATIENT)
Dept: CARDIOLOGY | Facility: MEDICAL CENTER | Age: 68
End: 2018-11-28

## 2018-11-28 ENCOUNTER — OFFICE VISIT (OUTPATIENT)
Dept: CARDIOLOGY | Facility: MEDICAL CENTER | Age: 68
End: 2018-11-28
Payer: MEDICARE

## 2018-11-28 VITALS
HEART RATE: 72 BPM | WEIGHT: 245 LBS | SYSTOLIC BLOOD PRESSURE: 136 MMHG | OXYGEN SATURATION: 96 % | BODY MASS INDEX: 40.82 KG/M2 | HEIGHT: 65 IN | DIASTOLIC BLOOD PRESSURE: 98 MMHG

## 2018-11-28 DIAGNOSIS — I24.9 ACUTE CORONARY SYNDROME (HCC): ICD-10-CM

## 2018-11-28 DIAGNOSIS — I25.10 CAD S/P PERCUTANEOUS CORONARY ANGIOPLASTY: Chronic | ICD-10-CM

## 2018-11-28 DIAGNOSIS — Z98.61 CAD S/P PERCUTANEOUS CORONARY ANGIOPLASTY: Chronic | ICD-10-CM

## 2018-11-28 DIAGNOSIS — R06.02 SOB (SHORTNESS OF BREATH): ICD-10-CM

## 2018-11-28 DIAGNOSIS — E66.01 MORBID OBESITY WITH BMI OF 40.0-44.9, ADULT (HCC): ICD-10-CM

## 2018-11-28 DIAGNOSIS — F17.200 TOBACCO DEPENDENCY: ICD-10-CM

## 2018-11-28 DIAGNOSIS — E11.9 TYPE 2 DIABETES MELLITUS WITHOUT COMPLICATION, WITHOUT LONG-TERM CURRENT USE OF INSULIN (HCC): ICD-10-CM

## 2018-11-28 DIAGNOSIS — J43.2 CENTRILOBULAR EMPHYSEMA (HCC): ICD-10-CM

## 2018-11-28 DIAGNOSIS — E78.2 MIXED HYPERLIPIDEMIA: ICD-10-CM

## 2018-11-28 PROCEDURE — 99214 OFFICE O/P EST MOD 30 MIN: CPT | Mod: 25 | Performed by: INTERNAL MEDICINE

## 2018-11-28 PROCEDURE — 94618 PULMONARY STRESS TESTING: CPT | Performed by: INTERNAL MEDICINE

## 2018-11-28 RX ORDER — EZETIMIBE 10 MG/1
10 TABLET ORAL DAILY
Qty: 30 TAB | Refills: 11 | Status: SHIPPED | OUTPATIENT
Start: 2018-11-28

## 2018-11-28 ASSESSMENT — ENCOUNTER SYMPTOMS
ORTHOPNEA: 0
HEMOPTYSIS: 0
STRIDOR: 0
SPUTUM PRODUCTION: 0
PALPITATIONS: 0
PND: 0
CLAUDICATION: 0
WEAKNESS: 0
WHEEZING: 0
CONSTITUTIONAL NEGATIVE: 1
BRUISES/BLEEDS EASILY: 0
COUGH: 0
GASTROINTESTINAL NEGATIVE: 1
LOSS OF CONSCIOUSNESS: 0
CHILLS: 0
MUSCULOSKELETAL NEGATIVE: 1
DIZZINESS: 0
RESPIRATORY NEGATIVE: 1
SORE THROAT: 0
NEUROLOGICAL NEGATIVE: 1
SHORTNESS OF BREATH: 0
CARDIOVASCULAR NEGATIVE: 1
EYES NEGATIVE: 1
FEVER: 0

## 2018-11-28 NOTE — PROGRESS NOTES
Chief Complaint   Patient presents with   • Coronary Artery Disease     PP of DB       Subjective:   Raine Harrison is a 68 y.o. female who presents today as a follow-up for her severe diffuse coronary disease.  She was in the hospital for this recently.  During that time she was turned down for surgery due to bad targets and likelihood of a bad respiratory outcome.  She since stopped smoking.  She is developed a cough after this.  Her blood pressure is otherwise controlled.  She is compliant with her medications.  We have not checked her cholesterol in some time and do not have one listed in the system.    Past Medical History:   Diagnosis Date   • Atherosclerotic heart disease of native coronary artery without angina pectoris     Coronary artery disease   • DVT (deep venous thrombosis) (HCC)    • Neuropathy (HCC)      Past Surgical History:   Procedure Laterality Date   • ANGIOPLASTY       No family history on file.  Social History     Social History   • Marital status: Single     Spouse name: N/A   • Number of children: N/A   • Years of education: N/A     Occupational History   • Not on file.     Social History Main Topics   • Smoking status: Former Smoker     Packs/day: 0.25     Years: 50.00     Types: Cigarettes   • Smokeless tobacco: Never Used   • Alcohol use Yes      Comment: rare   • Drug use: No   • Sexual activity: Not on file     Other Topics Concern   • Not on file     Social History Narrative   • No narrative on file     No Known Allergies  Outpatient Encounter Prescriptions as of 11/28/2018   Medication Sig Dispense Refill   • ezetimibe (ZETIA) 10 MG Tab Take 1 Tab by mouth every day. 30 Tab 11   • atorvastatin (LIPITOR) 80 MG tablet Take 1 Tab by mouth every bedtime. 90 Tab 3   • aspirin 81 MG EC tablet Take 1 Tab by mouth every day. 90 Tab 3   • metoprolol (LOPRESSOR) 25 MG Tab Take 1 Tab by mouth 2 Times a Day. 180 Tab 3   • nitroglycerin (NITROSTAT) 0.4 MG SL Tab Place 1 Tab under tongue as  "needed for Chest Pain. 25 Tab 11   • gabapentin (NEURONTIN) 400 MG Cap Take 800 mg by mouth 4 times a day.     • omeprazole (PRILOSEC) 40 MG delayed-release capsule Take 40 mg by mouth every day.     • predniSONE (DELTASONE) 20 MG Tab 1 TAB BY MOUTH ONCE A DAY X 5 DAYS TO HELP NASAL SYMPTOMS AND WHEEZING. TAKE WITH FOOD. 5 Tab 0   • [DISCONTINUED] azithromycin (ZITHROMAX) 250 MG Tab 2 TABS ON DAY 1, 1 TAB ON DAYS 2-5. 6 Tab 0   • [DISCONTINUED] ondansetron (ZOFRAN ODT) 4 MG TABLET DISPERSIBLE Take 1 Tab by mouth every 8 hours as needed for Nausea. 20 Tab 0     No facility-administered encounter medications on file as of 11/28/2018.      Review of Systems   Constitutional: Negative.  Negative for chills, fever and malaise/fatigue.   HENT: Negative.  Negative for sore throat.    Eyes: Negative.    Respiratory: Negative.  Negative for cough, hemoptysis, sputum production, shortness of breath, wheezing and stridor.    Cardiovascular: Negative.  Negative for chest pain, palpitations, orthopnea, claudication, leg swelling and PND.   Gastrointestinal: Negative.    Genitourinary: Negative.    Musculoskeletal: Negative.    Skin: Negative.    Neurological: Negative.  Negative for dizziness, loss of consciousness and weakness.   Endo/Heme/Allergies: Negative.  Does not bruise/bleed easily.   All other systems reviewed and are negative.       Objective:   /98 (BP Location: Left arm, Patient Position: Sitting, BP Cuff Size: Adult)   Pulse 72   Ht 1.651 m (5' 5\")   Wt 111.1 kg (245 lb)   SpO2 96%   BMI 40.77 kg/m²     Physical Exam   Constitutional: She appears well-developed and well-nourished. No distress.   HENT:   Head: Normocephalic and atraumatic.   Right Ear: External ear normal.   Left Ear: External ear normal.   Nose: Nose normal.   Mouth/Throat: No oropharyngeal exudate.   Eyes: Pupils are equal, round, and reactive to light. Conjunctivae and EOM are normal. Right eye exhibits no discharge. Left eye exhibits " no discharge. No scleral icterus.   Neck: Neck supple. No JVD present.   Cardiovascular: Normal rate, regular rhythm and intact distal pulses.  Exam reveals no gallop and no friction rub.    No murmur heard.  Pulmonary/Chest: Effort normal. No stridor. No respiratory distress. She has no wheezes. She has no rales. She exhibits no tenderness.   Abdominal: Soft. She exhibits no distension. There is no guarding.   Musculoskeletal: Normal range of motion. She exhibits no edema, tenderness or deformity.   Neurological: She is alert. She has normal reflexes. She displays normal reflexes. No cranial nerve deficit. She exhibits normal muscle tone. Coordination normal.   Skin: Skin is warm and dry. No rash noted. She is not diaphoretic. No erythema. No pallor.   Psychiatric: She has a normal mood and affect. Her behavior is normal. Judgment and thought content normal.   Nursing note and vitals reviewed.      Assessment:     1. CAD S/P percutaneous coronary angioplasty  LIPID PANEL    ezetimibe (ZETIA) 10 MG Tab   2. Acute coronary syndrome (HCC)     3. Morbid obesity with BMI of 40.0-44.9, adult (HCC)     4. Tobacco dependency  PULMONARY FUNCTION TESTS -Test requested: Complete Pulmonary Function Test    REFERRAL TO PULMONOLOGY   5. Type 2 diabetes mellitus without complication, without long-term current use of insulin ([facility])     6. Mixed hyperlipidemia  ezetimibe (ZETIA) 10 MG Tab   7. Centrilobular emphysema (HCC)   PULMONARY FUNCTION TESTS -Test requested: Complete Pulmonary Function Test       Medical Decision Making:  Today's Assessment / Status / Plan:     68-year-old female with severe diffuse coronary disease turned down for bypass surgery with residual disease but no chest pain.  We will add on Zetia to her medical regimen.  I referred her to pulmonary doubt that she has stopped smoking.  In the meantime I will get some pulmonary function tests to see what her lung function is.  I like to check her lipid panel and  see what her lipids are currently doing.  We will see her back in 2 months.  After the next visit we will schedule her for an echocardiogram.    Thank for you allowing me to take part in your patient's care, please call should you have any questions or would like to discuss this patient.

## 2018-11-28 NOTE — LETTER
[facility]   [address], [facility] [address]  [place] [address]  Phone: [phone]  Fax: [phone]              Raine Harrison  1950    Encounter Date: 11/28/2018    Leopoldo Moreland M.D.          PROGRESS NOTE:  No notes on file      Pcp Not In Computer

## 2018-11-28 NOTE — TELEPHONE ENCOUNTER
----- Message from Leopoldo Moreland M.D. sent at 11/28/2018  3:25 PM PST -----  This patient needs an echo and I forgot to bring this up.  Can you call her and get one scheduled for her?  Thanks